# Patient Record
Sex: FEMALE | Race: WHITE
[De-identification: names, ages, dates, MRNs, and addresses within clinical notes are randomized per-mention and may not be internally consistent; named-entity substitution may affect disease eponyms.]

---

## 2017-04-12 ENCOUNTER — HOSPITAL ENCOUNTER (INPATIENT)
Dept: HOSPITAL 23 - P1E | Age: 52
LOS: 7 days | Discharge: HOME | DRG: 897 | End: 2017-04-19
Admitting: PSYCHIATRY & NEUROLOGY
Payer: COMMERCIAL

## 2017-04-12 DIAGNOSIS — Z59.0: ICD-10-CM

## 2017-04-12 DIAGNOSIS — F11.20: Primary | ICD-10-CM

## 2017-04-12 DIAGNOSIS — B19.20: ICD-10-CM

## 2017-04-12 DIAGNOSIS — K21.9: ICD-10-CM

## 2017-04-12 DIAGNOSIS — F17.210: ICD-10-CM

## 2017-04-12 DIAGNOSIS — F13.20: ICD-10-CM

## 2017-04-12 PROCEDURE — HZ2ZZZZ DETOXIFICATION SERVICES FOR SUBSTANCE ABUSE TREATMENT: ICD-10-PCS | Performed by: PSYCHIATRY & NEUROLOGY

## 2017-04-12 SDOH — ECONOMIC STABILITY - HOUSING INSECURITY: HOMELESSNESS: Z59.0

## 2017-04-17 LAB
BARBITURATES UR QL SCN: 0.7 MG/DL (ref 0.2–2)
BARBITURATES UR QL SCN: 4.4 G/DL (ref 3.5–5)
BARBITURATES: (no result)
BASOPHIL#: 0.1 X10E3 (ref 0–0.3)
BASOPHIL%: 0.9 % (ref 0–2.5)
BENZODIAZ UR QL SCN: 16 U/L (ref 10–40)
BENZODIAZ UR QL SCN: 30 U/L (ref 10–42)
BENZODIAZEPINES: (no result)
BLOOD UREA NITROGEN: 22 MG/DL (ref 9–23)
BUN/CREATININE RATIO: 24.44
BZE UR QL SCN: 67 U/L (ref 32–92)
CALCIUM SERUM: 9.8 MG/DL (ref 8.4–10.2)
CK MB SERPL-RTO: 14.1 % (ref 11–15.5)
CK MB SERPL-RTO: 33.4 G/DL (ref 30–36)
COCAINE: (no result)
CREATININE SERUM: 0.9 MG/DL (ref 0.6–1.4)
DIFF IND: NO
DX ICD CODE: (no result)
DX ICD CODE: (no result)
EOSINOPHIL#: 0.1 X10E3 (ref 0–0.7)
EOSINOPHIL%: 1.5 % (ref 0–7)
GLOM FILT RATE ESTIMATED: 75.1 ML/MIN (ref 60–?)
GLUCOSE FASTING: 132 MG/DL (ref 70–110)
HEMATOCRIT: 45.7 % (ref 35–45)
HEMOGLOBIN: 15.2 GM/DL (ref 12–16)
KETONES UR QL: 101 MMOL/L (ref 100–111)
KETONES UR QL: 24 MMOL/L (ref 22–31)
LYMPHOCYTE#: 2 X10E3 (ref 1–3.5)
LYMPHOCYTE%: 30 % (ref 17–45)
MEAN CELL VOLUME: 81.8 FL (ref 83–96)
MEAN CORPUSCULAR HEMOGLOBIN: 27.3 PG (ref 28–34)
MEAN PLATELET VOLUME: 8.2 FL (ref 6.5–11.5)
MONOCYTE#: 0.4 X10E3 (ref 0–1)
MONOCYTE%: 6.6 % (ref 3–12)
NEUTROPHIL#: 4 X10E3 (ref 1.5–7.1)
NEUTROPHIL%: 61 % (ref 40–75)
OPIATES: (no result)
PLATELET COUNT: 340 X10E3 (ref 140–420)
POTASSIUM: 4.2 MMOL/L (ref 3.5–5.1)
PROTEIN TOTAL SERUM: 8 G/DL (ref 6–8.3)
RED BLOOD COUNT: 5.58 X10E (ref 3.9–5.3)
SODIUM: 136 MMOL/L (ref 135–145)
TRICYCLIC ANTIDEPRESSANTS: (no result)
U METHADONE: (no result)
WHITE BLOOD COUNT: 6.5 X10E3 (ref 4–10.5)

## 2017-04-22 LAB
HBSAG CONF (REFLEX-HEPPANEL): (no result)
HEP C AB SIGNAL TO CUTOFF: 30.8 RATIO (ref ?–1)

## 2017-08-09 ENCOUNTER — HOSPITAL ENCOUNTER (INPATIENT)
Dept: HOSPITAL 23 - P1E | Age: 52
LOS: 4 days | Discharge: HOME | DRG: 897 | End: 2017-08-13
Attending: PSYCHIATRY & NEUROLOGY | Admitting: PSYCHIATRY & NEUROLOGY
Payer: COMMERCIAL

## 2017-08-09 VITALS — WEIGHT: 180 LBS | HEIGHT: 69 IN | BODY MASS INDEX: 26.66 KG/M2

## 2017-08-09 DIAGNOSIS — K21.9: ICD-10-CM

## 2017-08-09 DIAGNOSIS — J44.9: ICD-10-CM

## 2017-08-09 DIAGNOSIS — F39: ICD-10-CM

## 2017-08-09 DIAGNOSIS — F17.200: ICD-10-CM

## 2017-08-09 DIAGNOSIS — Z86.19: ICD-10-CM

## 2017-08-09 DIAGNOSIS — F11.23: Primary | ICD-10-CM

## 2017-08-09 DIAGNOSIS — Z98.51: ICD-10-CM

## 2017-08-09 DIAGNOSIS — I10: ICD-10-CM

## 2017-08-09 DIAGNOSIS — F13.10: ICD-10-CM

## 2017-08-09 PROCEDURE — HZ2ZZZZ DETOXIFICATION SERVICES FOR SUBSTANCE ABUSE TREATMENT: ICD-10-PCS | Performed by: PSYCHIATRY & NEUROLOGY

## 2017-08-10 LAB
BARBITURATES: (no result)
BENZODIAZEPINES: (no result)
COCAINE: (no result)
DX ICD CODE: (no result)
DX ICD CODE: (no result)
OPIATES: (no result)
TRICYCLIC ANTIDEPRESSANTS: (no result)
U METHADONE: (no result)

## 2019-01-01 ENCOUNTER — TELEPHONE (OUTPATIENT)
Dept: ENDOCRINOLOGY | Facility: CLINIC | Age: 54
End: 2019-01-01

## 2019-01-01 ENCOUNTER — TELEPHONE (OUTPATIENT)
Dept: PSYCHIATRY | Facility: CLINIC | Age: 54
End: 2019-01-01

## 2019-01-01 ENCOUNTER — APPOINTMENT (OUTPATIENT)
Dept: NUCLEAR MEDICINE | Facility: HOSPITAL | Age: 54
End: 2019-01-01

## 2019-01-01 ENCOUNTER — OFFICE VISIT (OUTPATIENT)
Dept: ENDOCRINOLOGY | Facility: CLINIC | Age: 54
End: 2019-01-01

## 2019-01-01 ENCOUNTER — APPOINTMENT (OUTPATIENT)
Dept: CARDIOLOGY | Facility: HOSPITAL | Age: 54
End: 2019-01-01

## 2019-01-01 ENCOUNTER — OFFICE VISIT (OUTPATIENT)
Dept: PSYCHIATRY | Facility: CLINIC | Age: 54
End: 2019-01-01

## 2019-01-01 ENCOUNTER — APPOINTMENT (OUTPATIENT)
Dept: RESPIRATORY THERAPY | Facility: HOSPITAL | Age: 54
End: 2019-01-01

## 2019-01-01 ENCOUNTER — APPOINTMENT (OUTPATIENT)
Dept: GENERAL RADIOLOGY | Facility: HOSPITAL | Age: 54
End: 2019-01-01

## 2019-01-01 ENCOUNTER — LAB (OUTPATIENT)
Dept: LAB | Facility: HOSPITAL | Age: 54
End: 2019-01-01

## 2019-01-01 ENCOUNTER — HOSPITAL ENCOUNTER (INPATIENT)
Facility: HOSPITAL | Age: 54
LOS: 8 days | Discharge: HOME | End: 2019-12-31
Attending: EMERGENCY MEDICINE | Admitting: INTERNAL MEDICINE

## 2019-01-01 ENCOUNTER — HOSPITAL ENCOUNTER (INPATIENT)
Facility: HOSPITAL | Age: 54
End: 2019-01-01
Attending: INTERNAL MEDICINE | Admitting: INTERNAL MEDICINE

## 2019-01-01 VITALS
OXYGEN SATURATION: 96 % | WEIGHT: 290.57 LBS | BODY MASS INDEX: 43.04 KG/M2 | HEART RATE: 85 BPM | HEIGHT: 69 IN | DIASTOLIC BLOOD PRESSURE: 72 MMHG | RESPIRATION RATE: 18 BRPM | TEMPERATURE: 98.3 F | SYSTOLIC BLOOD PRESSURE: 104 MMHG

## 2019-01-01 VITALS
OXYGEN SATURATION: 97 % | WEIGHT: 265 LBS | DIASTOLIC BLOOD PRESSURE: 82 MMHG | HEIGHT: 69 IN | SYSTOLIC BLOOD PRESSURE: 142 MMHG | HEART RATE: 116 BPM | BODY MASS INDEX: 39.25 KG/M2

## 2019-01-01 DIAGNOSIS — I20.0 UNSTABLE ANGINA PECTORIS (HCC): ICD-10-CM

## 2019-01-01 DIAGNOSIS — I10 HYPERTENSION, UNSPECIFIED TYPE: ICD-10-CM

## 2019-01-01 DIAGNOSIS — E78.2 HYPERLIPIDEMIA, MIXED: ICD-10-CM

## 2019-01-01 DIAGNOSIS — E11.65 TYPE 2 DIABETES MELLITUS WITH HYPERGLYCEMIA, UNSPECIFIED WHETHER LONG TERM INSULIN USE (HCC): Primary | ICD-10-CM

## 2019-01-01 DIAGNOSIS — E11.65 TYPE 2 DIABETES MELLITUS WITH HYPERGLYCEMIA, UNSPECIFIED WHETHER LONG TERM INSULIN USE (HCC): ICD-10-CM

## 2019-01-01 DIAGNOSIS — E55.9 VITAMIN D DEFICIENCY: ICD-10-CM

## 2019-01-01 DIAGNOSIS — IMO0001 INSULIN DEPENDENT DIABETES MELLITUS: ICD-10-CM

## 2019-01-01 DIAGNOSIS — F31.63 BIPOLAR 1 DISORDER, MIXED, SEVERE (HCC): Primary | ICD-10-CM

## 2019-01-01 DIAGNOSIS — I10 ESSENTIAL HYPERTENSION: ICD-10-CM

## 2019-01-01 DIAGNOSIS — J44.9 CHRONIC OBSTRUCTIVE PULMONARY DISEASE, UNSPECIFIED COPD TYPE (HCC): ICD-10-CM

## 2019-01-01 DIAGNOSIS — I20.0 UNSTABLE ANGINA (HCC): Primary | ICD-10-CM

## 2019-01-01 DIAGNOSIS — F43.12 CHRONIC POSTTRAUMATIC STRESS DISORDER: Chronic | ICD-10-CM

## 2019-01-01 DIAGNOSIS — F31.63 BIPOLAR 1 DISORDER, MIXED, SEVERE (HCC): ICD-10-CM

## 2019-01-01 DIAGNOSIS — F43.10 POST TRAUMATIC STRESS DISORDER (PTSD): ICD-10-CM

## 2019-01-01 DIAGNOSIS — R94.39 ABNORMAL NUCLEAR STRESS TEST: ICD-10-CM

## 2019-01-01 DIAGNOSIS — E11.65 TYPE 2 DIABETES MELLITUS WITH HYPERGLYCEMIA, WITHOUT LONG-TERM CURRENT USE OF INSULIN (HCC): ICD-10-CM

## 2019-01-01 DIAGNOSIS — R07.9 CHEST PAIN IN ADULT: ICD-10-CM

## 2019-01-01 DIAGNOSIS — E11.65 TYPE 2 DIABETES MELLITUS WITH HYPERGLYCEMIA, WITHOUT LONG-TERM CURRENT USE OF INSULIN (HCC): Primary | ICD-10-CM

## 2019-01-01 DIAGNOSIS — E78.2 HYPERLIPIDEMIA, MIXED: Primary | ICD-10-CM

## 2019-01-01 DIAGNOSIS — F31.64 BIPOLAR AFFECTIVE DISORDER, MIXED, SEVERE, WITH PSYCHOTIC BEHAVIOR (HCC): Primary | Chronic | ICD-10-CM

## 2019-01-01 DIAGNOSIS — E66.01 OBESITY, CLASS III, BMI 40-49.9 (MORBID OBESITY) (HCC): ICD-10-CM

## 2019-01-01 DIAGNOSIS — F31.64 BIPOLAR AFFECTIVE DISORDER, MIXED, SEVERE, WITH PSYCHOTIC BEHAVIOR (HCC): Chronic | ICD-10-CM

## 2019-01-01 LAB
ABO GROUP BLD: NORMAL
ALBUMIN SERPL-MCNC: 3.5 G/DL (ref 3.5–5.2)
ALBUMIN SERPL-MCNC: 4.4 G/DL (ref 3.5–5.2)
ALBUMIN SERPL-MCNC: 4.5 G/DL (ref 3.5–5.2)
ALBUMIN UR-MCNC: <1.2 MG/DL
ALBUMIN/GLOB SERPL: 1.1 G/DL
ALBUMIN/GLOB SERPL: 1.2 G/DL
ALBUMIN/GLOB SERPL: 1.3 G/DL
ALP SERPL-CCNC: 108 U/L (ref 39–117)
ALP SERPL-CCNC: 96 U/L (ref 39–117)
ALP SERPL-CCNC: 97 U/L (ref 39–117)
ALT SERPL W P-5'-P-CCNC: 30 U/L (ref 1–33)
ALT SERPL W P-5'-P-CCNC: 41 U/L (ref 1–33)
ALT SERPL W P-5'-P-CCNC: 62 U/L (ref 1–33)
ANION GAP SERPL CALCULATED.3IONS-SCNC: 10 MMOL/L (ref 5–15)
ANION GAP SERPL CALCULATED.3IONS-SCNC: 11 MMOL/L (ref 5–15)
ANION GAP SERPL CALCULATED.3IONS-SCNC: 11 MMOL/L (ref 5–15)
ANION GAP SERPL CALCULATED.3IONS-SCNC: 12 MMOL/L (ref 5–15)
ANION GAP SERPL CALCULATED.3IONS-SCNC: 15.9 MMOL/L (ref 5–15)
APTT PPP: 24.8 SECONDS (ref 24–31)
APTT PPP: 25.4 SECONDS (ref 24–31)
APTT PPP: 25.5 SECONDS (ref 24–31)
APTT PPP: 25.8 SECONDS (ref 24–31)
AST SERPL-CCNC: 27 U/L (ref 1–32)
AST SERPL-CCNC: 42 U/L (ref 1–32)
AST SERPL-CCNC: 59 U/L (ref 1–32)
BASOPHILS # BLD AUTO: 0 10*3/MM3 (ref 0–0.2)
BASOPHILS # BLD AUTO: 0 10*3/MM3 (ref 0–0.2)
BASOPHILS # BLD AUTO: 0.1 10*3/MM3 (ref 0–0.2)
BASOPHILS # BLD AUTO: 0.1 10*3/MM3 (ref 0–0.2)
BASOPHILS NFR BLD AUTO: 0.3 % (ref 0–1.5)
BASOPHILS NFR BLD AUTO: 0.4 % (ref 0–1.5)
BASOPHILS NFR BLD AUTO: 0.9 % (ref 0–1.5)
BASOPHILS NFR BLD AUTO: 1.1 % (ref 0–1.5)
BH CV ECHO MEAS - AO MAX PG (FULL): 14.4 MMHG
BH CV ECHO MEAS - AO MAX PG: 23 MMHG
BH CV ECHO MEAS - AO MEAN PG (FULL): 8.3 MMHG
BH CV ECHO MEAS - AO MEAN PG: 12.9 MMHG
BH CV ECHO MEAS - AO ROOT AREA (BSA CORRECTED): 1.3
BH CV ECHO MEAS - AO ROOT AREA: 7.1 CM^2
BH CV ECHO MEAS - AO ROOT DIAM: 3 CM
BH CV ECHO MEAS - AO V2 MAX: 239.1 CM/SEC
BH CV ECHO MEAS - AO V2 MEAN: 170.9 CM/SEC
BH CV ECHO MEAS - AO V2 VTI: 46.5 CM
BH CV ECHO MEAS - AORTIC HR: 75.3 BPM
BH CV ECHO MEAS - AORTIC R-R: 0.8 SEC
BH CV ECHO MEAS - AVA(I,A): 2.5 CM^2
BH CV ECHO MEAS - AVA(I,D): 2.5 CM^2
BH CV ECHO MEAS - AVA(V,A): 1.9 CM^2
BH CV ECHO MEAS - AVA(V,D): 1.9 CM^2
BH CV ECHO MEAS - BSA(HAYCOCK): 2.6 M^2
BH CV ECHO MEAS - BSA: 2.4 M^2
BH CV ECHO MEAS - BZI_BMI: 41.5 KILOGRAMS/M^2
BH CV ECHO MEAS - BZI_METRIC_HEIGHT: 175.3 CM
BH CV ECHO MEAS - BZI_METRIC_WEIGHT: 127.5 KG
BH CV ECHO MEAS - CI(AO): 10.4 L/MIN/M^2
BH CV ECHO MEAS - CI(LVOT): 3.7 L/MIN/M^2
BH CV ECHO MEAS - CO(AO): 24.8 L/MIN
BH CV ECHO MEAS - CO(LVOT): 8.9 L/MIN
BH CV ECHO MEAS - EDV(CUBED): 71.3 ML
BH CV ECHO MEAS - EDV(MOD-SP4): 96.1 ML
BH CV ECHO MEAS - EDV(TEICH): 76.3 ML
BH CV ECHO MEAS - EF(CUBED): 66.3 %
BH CV ECHO MEAS - EF(MOD-BP): 62 %
BH CV ECHO MEAS - EF(MOD-SP4): 62.3 %
BH CV ECHO MEAS - EF(TEICH): 58.2 %
BH CV ECHO MEAS - ESV(CUBED): 24.1 ML
BH CV ECHO MEAS - ESV(MOD-SP4): 36.2 ML
BH CV ECHO MEAS - ESV(TEICH): 31.9 ML
BH CV ECHO MEAS - FS: 30.4 %
BH CV ECHO MEAS - IVS/LVPW: 0.97
BH CV ECHO MEAS - IVSD: 0.93 CM
BH CV ECHO MEAS - LA DIMENSION(2D): 4.5 CM
BH CV ECHO MEAS - LV DIASTOLIC VOL/BSA (35-75): 40.2 ML/M^2
BH CV ECHO MEAS - LV MASS(C)D: 124.3 GRAMS
BH CV ECHO MEAS - LV MASS(C)DI: 52.1 GRAMS/M^2
BH CV ECHO MEAS - LV MAX PG: 8.6 MMHG
BH CV ECHO MEAS - LV MEAN PG: 4.5 MMHG
BH CV ECHO MEAS - LV SYSTOLIC VOL/BSA (12-30): 15.2 ML/M^2
BH CV ECHO MEAS - LV V1 MAX: 146.3 CM/SEC
BH CV ECHO MEAS - LV V1 MEAN: 98.8 CM/SEC
BH CV ECHO MEAS - LV V1 VTI: 37.6 CM
BH CV ECHO MEAS - LVIDD: 4.1 CM
BH CV ECHO MEAS - LVIDS: 2.9 CM
BH CV ECHO MEAS - LVOT AREA: 3.1 CM^2
BH CV ECHO MEAS - LVOT DIAM: 2 CM
BH CV ECHO MEAS - LVPWD: 0.96 CM
BH CV ECHO MEAS - MV A MAX VEL: 142.4 CM/SEC
BH CV ECHO MEAS - MV DEC SLOPE: 403.2 CM/SEC^2
BH CV ECHO MEAS - MV DEC TIME: 0.3 SEC
BH CV ECHO MEAS - MV E MAX VEL: 119.7 CM/SEC
BH CV ECHO MEAS - MV E/A: 0.84
BH CV ECHO MEAS - MV MAX PG: 7.2 MMHG
BH CV ECHO MEAS - MV MEAN PG: 3.2 MMHG
BH CV ECHO MEAS - MV V2 MAX: 134.1 CM/SEC
BH CV ECHO MEAS - MV V2 MEAN: 84 CM/SEC
BH CV ECHO MEAS - MV V2 VTI: 38.4 CM
BH CV ECHO MEAS - MVA(VTI): 3.1 CM^2
BH CV ECHO MEAS - PA MAX PG (FULL): 3.3 MMHG
BH CV ECHO MEAS - PA MAX PG: 5.8 MMHG
BH CV ECHO MEAS - PA V2 MAX: 120.4 CM/SEC
BH CV ECHO MEAS - PULM A REVS DUR: 0.12 SEC
BH CV ECHO MEAS - PULM A REVS VEL: 32.5 CM/SEC
BH CV ECHO MEAS - PULM DIAS VEL: 30.5 CM/SEC
BH CV ECHO MEAS - PULM S/D: 1.5
BH CV ECHO MEAS - PULM SYS VEL: 47.1 CM/SEC
BH CV ECHO MEAS - PVA(V,A): 1.9 CM^2
BH CV ECHO MEAS - PVA(V,D): 1.9 CM^2
BH CV ECHO MEAS - QP/QS: 0.44
BH CV ECHO MEAS - RV MAX PG: 2.5 MMHG
BH CV ECHO MEAS - RV MEAN PG: 1.4 MMHG
BH CV ECHO MEAS - RV V1 MAX: 78.8 CM/SEC
BH CV ECHO MEAS - RV V1 MEAN: 55.9 CM/SEC
BH CV ECHO MEAS - RV V1 VTI: 17.7 CM
BH CV ECHO MEAS - RVDD: 3 CM
BH CV ECHO MEAS - RVOT AREA: 2.9 CM^2
BH CV ECHO MEAS - RVOT DIAM: 1.9 CM
BH CV ECHO MEAS - SI(AO): 138.4 ML/M^2
BH CV ECHO MEAS - SI(CUBED): 19.8 ML/M^2
BH CV ECHO MEAS - SI(LVOT): 49.4 ML/M^2
BH CV ECHO MEAS - SI(MOD-SP4): 25.1 ML/M^2
BH CV ECHO MEAS - SI(TEICH): 18.6 ML/M^2
BH CV ECHO MEAS - SV(AO): 330.2 ML
BH CV ECHO MEAS - SV(CUBED): 47.3 ML
BH CV ECHO MEAS - SV(LVOT): 117.9 ML
BH CV ECHO MEAS - SV(MOD-SP4): 59.8 ML
BH CV ECHO MEAS - SV(RVOT): 52 ML
BH CV ECHO MEAS - SV(TEICH): 44.4 ML
BH CV NUCLEAR PRIOR STUDY: 3
BH CV STRESS BP STAGE 1: NORMAL
BH CV STRESS BP STAGE 2: NORMAL
BH CV STRESS BP STAGE 3: NORMAL
BH CV STRESS BP STAGE 4: NORMAL
BH CV STRESS COMMENTS STAGE 1: NORMAL
BH CV STRESS COMMENTS STAGE 2: NORMAL
BH CV STRESS DOSE REGADENOSON STAGE 1: 0.4
BH CV STRESS DURATION MIN STAGE 1: 1
BH CV STRESS DURATION MIN STAGE 2: 1
BH CV STRESS DURATION MIN STAGE 3: 1
BH CV STRESS DURATION MIN STAGE 4: 1
BH CV STRESS DURATION SEC STAGE 1: 10
BH CV STRESS DURATION SEC STAGE 2: 0
BH CV STRESS HR STAGE 1: 73
BH CV STRESS HR STAGE 2: 103
BH CV STRESS HR STAGE 3: 104
BH CV STRESS HR STAGE 4: 95
BH CV STRESS PROTOCOL 1: NORMAL
BH CV STRESS RECOVERY BP: NORMAL MMHG
BH CV STRESS RECOVERY HR: 92 BPM
BH CV STRESS STAGE 1: 1
BH CV STRESS STAGE 2: 2
BH CV STRESS STAGE 3: 3
BH CV STRESS STAGE 4: 4
BH CV XLRA MEAS - DIST GSV CALF DIST LEFT: 0.34 CM
BH CV XLRA MEAS - DIST GSV CALF DIST RIGHT: 0.32 CM
BH CV XLRA MEAS - DIST GSV THIGH DIST LEFT: 0.48 CM
BH CV XLRA MEAS - DIST GSV THIGH DIST RIGHT: 0.51 CM
BH CV XLRA MEAS - MID GSV CALF LEFT: 0.29 CM
BH CV XLRA MEAS - MID GSV CALF RIGHT: 0.32 CM
BH CV XLRA MEAS - MID GSV THIGH  LEFT: 0.52 CM
BH CV XLRA MEAS - MID GSV THIGH  RIGHT: 0.47 CM
BH CV XLRA MEAS - PROX GSV CALF DIST LEFT: 0.36 CM
BH CV XLRA MEAS - PROX GSV CALF DIST RIGHT: 0.38 CM
BH CV XLRA MEAS - PROX GSV THIGH  LEFT: 0.51 CM
BH CV XLRA MEAS - PROX GSV THIGH  RIGHT: 0.48 CM
BH CV XLRA MEAS LEFT CCA RATIO VEL: 111 CM/SEC
BH CV XLRA MEAS LEFT DIST CCA EDV: 24.6 CM/SEC
BH CV XLRA MEAS LEFT DIST CCA PSV: 74.2 CM/SEC
BH CV XLRA MEAS LEFT DIST ICA EDV: -26.3 CM/SEC
BH CV XLRA MEAS LEFT DIST ICA PSV: -71.7 CM/SEC
BH CV XLRA MEAS LEFT ICA RATIO VEL: -89.5 CM/SEC
BH CV XLRA MEAS LEFT ICA/CCA RATIO: -0.81
BH CV XLRA MEAS LEFT PROX CCA EDV: 21.7 CM/SEC
BH CV XLRA MEAS LEFT PROX CCA PSV: 111 CM/SEC
BH CV XLRA MEAS LEFT PROX ECA PSV: -69.4 CM/SEC
BH CV XLRA MEAS LEFT PROX ICA EDV: -31.1 CM/SEC
BH CV XLRA MEAS LEFT PROX ICA PSV: -82 CM/SEC
BH CV XLRA MEAS LEFT PROX SCLA PSV: 125 CM/SEC
BH CV XLRA MEAS LEFT VERTEBRAL A PSV: 54.9 CM/SEC
BH CV XLRA MEAS RIGHT CCA RATIO VEL: 83.4 CM/SEC
BH CV XLRA MEAS RIGHT DIST CCA EDV: 13.7 CM/SEC
BH CV XLRA MEAS RIGHT DIST CCA PSV: 53.7 CM/SEC
BH CV XLRA MEAS RIGHT DIST ICA EDV: -22.1 CM/SEC
BH CV XLRA MEAS RIGHT DIST ICA PSV: -65.4 CM/SEC
BH CV XLRA MEAS RIGHT ICA RATIO VEL: -75.2 CM/SEC
BH CV XLRA MEAS RIGHT ICA/CCA RATIO: -0.9
BH CV XLRA MEAS RIGHT PROX CCA EDV: 20.3 CM/SEC
BH CV XLRA MEAS RIGHT PROX CCA PSV: 83.4 CM/SEC
BH CV XLRA MEAS RIGHT PROX ECA PSV: -75.2 CM/SEC
BH CV XLRA MEAS RIGHT PROX ICA EDV: -27.5 CM/SEC
BH CV XLRA MEAS RIGHT PROX ICA PSV: -75.2 CM/SEC
BH CV XLRA MEAS RIGHT PROX SCLA PSV: 167 CM/SEC
BH CV XLRA MEAS RIGHT VERTEBRAL A PSV: -56 CM/SEC
BILIRUB SERPL-MCNC: 0.2 MG/DL (ref 0.2–1.2)
BILIRUB SERPL-MCNC: 0.2 MG/DL (ref 0.2–1.2)
BILIRUB SERPL-MCNC: 0.3 MG/DL (ref 0.2–1.2)
BILIRUB UR QL STRIP: NEGATIVE
BLD GP AB SCN SERPL QL: NEGATIVE
BUN BLD-MCNC: 13 MG/DL (ref 6–20)
BUN BLD-MCNC: 14 MG/DL (ref 6–20)
BUN BLD-MCNC: 15 MG/DL (ref 6–20)
BUN BLD-MCNC: 16 MG/DL (ref 6–20)
BUN BLD-MCNC: 17 MG/DL (ref 6–20)
BUN BLD-MCNC: 17 MG/DL (ref 6–20)
BUN BLD-MCNC: 19 MG/DL (ref 6–20)
BUN/CREAT SERPL: 15 (ref 7–25)
BUN/CREAT SERPL: 15.1 (ref 7–25)
BUN/CREAT SERPL: 16.9 (ref 7–25)
BUN/CREAT SERPL: 18.3 (ref 7–25)
BUN/CREAT SERPL: 18.5 (ref 7–25)
BUN/CREAT SERPL: 20 (ref 7–25)
BUN/CREAT SERPL: 20.7 (ref 7–25)
CALCIUM SPEC-SCNC: 8.6 MG/DL (ref 8.6–10.5)
CALCIUM SPEC-SCNC: 8.6 MG/DL (ref 8.6–10.5)
CALCIUM SPEC-SCNC: 8.7 MG/DL (ref 8.6–10.5)
CALCIUM SPEC-SCNC: 8.7 MG/DL (ref 8.6–10.5)
CALCIUM SPEC-SCNC: 8.8 MG/DL (ref 8.6–10.5)
CALCIUM SPEC-SCNC: 9.1 MG/DL (ref 8.6–10.5)
CALCIUM SPEC-SCNC: 9.3 MG/DL (ref 8.6–10.5)
CHLORIDE SERPL-SCNC: 100 MMOL/L (ref 98–107)
CHLORIDE SERPL-SCNC: 100 MMOL/L (ref 98–107)
CHLORIDE SERPL-SCNC: 103 MMOL/L (ref 98–107)
CHLORIDE SERPL-SCNC: 92 MMOL/L (ref 98–107)
CHLORIDE SERPL-SCNC: 97 MMOL/L (ref 98–107)
CHLORIDE SERPL-SCNC: 99 MMOL/L (ref 98–107)
CHLORIDE SERPL-SCNC: 99 MMOL/L (ref 98–107)
CHOLEST SERPL-MCNC: 170 MG/DL (ref 0–200)
CHOLEST SERPL-MCNC: 224 MG/DL (ref 0–200)
CLARITY UR: CLEAR
CLOSE TME COLL+ADP + EPINEP PNL BLD: 95 % (ref 86–100)
CO2 SERPL-SCNC: 24.1 MMOL/L (ref 22–29)
CO2 SERPL-SCNC: 26 MMOL/L (ref 22–29)
CO2 SERPL-SCNC: 26 MMOL/L (ref 22–29)
CO2 SERPL-SCNC: 27 MMOL/L (ref 22–29)
CO2 SERPL-SCNC: 27 MMOL/L (ref 22–29)
CO2 SERPL-SCNC: 28 MMOL/L (ref 22–29)
CO2 SERPL-SCNC: 28 MMOL/L (ref 22–29)
COLOR UR: YELLOW
CREAT BLD-MCNC: 0.82 MG/DL (ref 0.57–1)
CREAT BLD-MCNC: 0.82 MG/DL (ref 0.57–1)
CREAT BLD-MCNC: 0.83 MG/DL (ref 0.57–1)
CREAT BLD-MCNC: 0.86 MG/DL (ref 0.57–1)
CREAT BLD-MCNC: 0.92 MG/DL (ref 0.57–1)
CREAT BLD-MCNC: 0.95 MG/DL (ref 0.57–1)
CREAT BLD-MCNC: 1.07 MG/DL (ref 0.57–1)
CREAT UR-MCNC: 39.7 MG/DL
D DIMER PPP FEU-MCNC: 0.29 MCGFEU/ML (ref 0.17–0.59)
DEPRECATED RDW RBC AUTO: 45.9 FL (ref 37–54)
DEPRECATED RDW RBC AUTO: 46.4 FL (ref 37–54)
DEPRECATED RDW RBC AUTO: 46.8 FL (ref 37–54)
DEPRECATED RDW RBC AUTO: 47.7 FL (ref 37–54)
DEPRECATED RDW RBC AUTO: 48.1 FL (ref 37–54)
EOSINOPHIL # BLD AUTO: 0.2 10*3/MM3 (ref 0–0.4)
EOSINOPHIL # BLD AUTO: 0.3 10*3/MM3 (ref 0–0.4)
EOSINOPHIL NFR BLD AUTO: 2.7 % (ref 0.3–6.2)
EOSINOPHIL NFR BLD AUTO: 4.3 % (ref 0.3–6.2)
EOSINOPHIL NFR BLD AUTO: 4.5 % (ref 0.3–6.2)
EOSINOPHIL NFR BLD AUTO: 4.8 % (ref 0.3–6.2)
ERYTHROCYTE [DISTWIDTH] IN BLOOD BY AUTOMATED COUNT: 15.5 % (ref 12.3–15.4)
ERYTHROCYTE [DISTWIDTH] IN BLOOD BY AUTOMATED COUNT: 15.7 % (ref 12.3–15.4)
ERYTHROCYTE [DISTWIDTH] IN BLOOD BY AUTOMATED COUNT: 15.8 % (ref 12.3–15.4)
GFR SERPL CREATININE-BSD FRML MDRD: 54 ML/MIN/1.73
GFR SERPL CREATININE-BSD FRML MDRD: 62 ML/MIN/1.73
GFR SERPL CREATININE-BSD FRML MDRD: 64 ML/MIN/1.73
GFR SERPL CREATININE-BSD FRML MDRD: 69 ML/MIN/1.73
GFR SERPL CREATININE-BSD FRML MDRD: 72 ML/MIN/1.73
GFR SERPL CREATININE-BSD FRML MDRD: 73 ML/MIN/1.73
GFR SERPL CREATININE-BSD FRML MDRD: 73 ML/MIN/1.73
GLOBULIN UR ELPH-MCNC: 3.1 GM/DL
GLOBULIN UR ELPH-MCNC: 3.6 GM/DL
GLOBULIN UR ELPH-MCNC: 3.7 GM/DL
GLUCOSE BLD-MCNC: 104 MG/DL (ref 65–99)
GLUCOSE BLD-MCNC: 190 MG/DL (ref 65–99)
GLUCOSE BLD-MCNC: 200 MG/DL (ref 65–99)
GLUCOSE BLD-MCNC: 248 MG/DL (ref 65–99)
GLUCOSE BLD-MCNC: 252 MG/DL (ref 65–99)
GLUCOSE BLD-MCNC: 315 MG/DL (ref 65–99)
GLUCOSE BLD-MCNC: 63 MG/DL (ref 65–99)
GLUCOSE BLDC GLUCOMTR-MCNC: 119 MG/DL (ref 70–105)
GLUCOSE BLDC GLUCOMTR-MCNC: 123 MG/DL (ref 70–105)
GLUCOSE BLDC GLUCOMTR-MCNC: 130 MG/DL (ref 70–105)
GLUCOSE BLDC GLUCOMTR-MCNC: 131 MG/DL (ref 70–105)
GLUCOSE BLDC GLUCOMTR-MCNC: 149 MG/DL (ref 70–105)
GLUCOSE BLDC GLUCOMTR-MCNC: 161 MG/DL (ref 70–105)
GLUCOSE BLDC GLUCOMTR-MCNC: 180 MG/DL (ref 70–105)
GLUCOSE BLDC GLUCOMTR-MCNC: 182 MG/DL (ref 70–105)
GLUCOSE BLDC GLUCOMTR-MCNC: 182 MG/DL (ref 70–105)
GLUCOSE BLDC GLUCOMTR-MCNC: 184 MG/DL (ref 70–105)
GLUCOSE BLDC GLUCOMTR-MCNC: 188 MG/DL (ref 70–105)
GLUCOSE BLDC GLUCOMTR-MCNC: 191 MG/DL (ref 70–105)
GLUCOSE BLDC GLUCOMTR-MCNC: 191 MG/DL (ref 70–105)
GLUCOSE BLDC GLUCOMTR-MCNC: 194 MG/DL (ref 70–105)
GLUCOSE BLDC GLUCOMTR-MCNC: 211 MG/DL (ref 70–105)
GLUCOSE BLDC GLUCOMTR-MCNC: 216 MG/DL (ref 70–105)
GLUCOSE BLDC GLUCOMTR-MCNC: 223 MG/DL (ref 70–105)
GLUCOSE BLDC GLUCOMTR-MCNC: 225 MG/DL (ref 70–105)
GLUCOSE BLDC GLUCOMTR-MCNC: 236 MG/DL (ref 70–105)
GLUCOSE BLDC GLUCOMTR-MCNC: 247 MG/DL (ref 70–105)
GLUCOSE BLDC GLUCOMTR-MCNC: 250 MG/DL (ref 70–105)
GLUCOSE BLDC GLUCOMTR-MCNC: 256 MG/DL (ref 70–105)
GLUCOSE BLDC GLUCOMTR-MCNC: 263 MG/DL (ref 70–105)
GLUCOSE BLDC GLUCOMTR-MCNC: 269 MG/DL (ref 70–105)
GLUCOSE BLDC GLUCOMTR-MCNC: 276 MG/DL (ref 70–105)
GLUCOSE BLDC GLUCOMTR-MCNC: 276 MG/DL (ref 70–105)
GLUCOSE BLDC GLUCOMTR-MCNC: 279 MG/DL (ref 70–105)
GLUCOSE BLDC GLUCOMTR-MCNC: 281 MG/DL (ref 70–130)
GLUCOSE BLDC GLUCOMTR-MCNC: 313 MG/DL (ref 70–105)
GLUCOSE BLDC GLUCOMTR-MCNC: 319 MG/DL (ref 70–105)
GLUCOSE BLDC GLUCOMTR-MCNC: 396 MG/DL (ref 70–105)
GLUCOSE BLDC GLUCOMTR-MCNC: 72 MG/DL (ref 70–105)
GLUCOSE BLDC GLUCOMTR-MCNC: 99 MG/DL (ref 70–105)
GLUCOSE UR STRIP-MCNC: ABNORMAL MG/DL
HBA1C MFR BLD: 8.5 % (ref 3.5–5.6)
HBA1C MFR BLD: 9.2 % (ref 3.5–5.6)
HCT VFR BLD AUTO: 33.6 % (ref 34–46.6)
HCT VFR BLD AUTO: 33.7 % (ref 34–46.6)
HCT VFR BLD AUTO: 34.2 % (ref 34–46.6)
HCT VFR BLD AUTO: 35.7 % (ref 34–46.6)
HCT VFR BLD AUTO: 36.8 % (ref 34–46.6)
HCV AB SER DONR QL: REACTIVE
HDLC SERPL-MCNC: 34 MG/DL (ref 40–60)
HDLC SERPL-MCNC: 40 MG/DL (ref 40–60)
HGB BLD-MCNC: 11.1 G/DL (ref 12–15.9)
HGB BLD-MCNC: 11.2 G/DL (ref 12–15.9)
HGB BLD-MCNC: 11.3 G/DL (ref 12–15.9)
HGB BLD-MCNC: 12 G/DL (ref 12–15.9)
HGB BLD-MCNC: 12.2 G/DL (ref 12–15.9)
HGB UR QL STRIP.AUTO: NEGATIVE
HOLD SPECIMEN: NORMAL
INR PPP: 0.92 (ref 0.9–1.1)
INR PPP: 0.94 (ref 0.9–1.1)
INR PPP: 0.95 (ref 0.9–1.1)
INR PPP: 0.98 (ref 0.9–1.1)
KETONES UR QL STRIP: NEGATIVE
LDLC SERPL CALC-MCNC: 150 MG/DL (ref 0–100)
LDLC SERPL CALC-MCNC: 97 MG/DL (ref 0–100)
LDLC/HDLC SERPL: 2.85 {RATIO}
LDLC/HDLC SERPL: 3.75 {RATIO}
LEUKOCYTE ESTERASE UR QL STRIP.AUTO: NEGATIVE
LIPASE SERPL-CCNC: 17 U/L (ref 13–60)
LV EF NUC BP: 69 %
LYMPHOCYTES # BLD AUTO: 1.5 10*3/MM3 (ref 0.7–3.1)
LYMPHOCYTES # BLD AUTO: 1.6 10*3/MM3 (ref 0.7–3.1)
LYMPHOCYTES # BLD AUTO: 2 10*3/MM3 (ref 0.7–3.1)
LYMPHOCYTES # BLD AUTO: 2.2 10*3/MM3 (ref 0.7–3.1)
LYMPHOCYTES NFR BLD AUTO: 24.7 % (ref 19.6–45.3)
LYMPHOCYTES NFR BLD AUTO: 26.8 % (ref 19.6–45.3)
LYMPHOCYTES NFR BLD AUTO: 28.1 % (ref 19.6–45.3)
LYMPHOCYTES NFR BLD AUTO: 33.9 % (ref 19.6–45.3)
MAGNESIUM SERPL-MCNC: 2.2 MG/DL (ref 1.6–2.6)
MAXIMAL PREDICTED HEART RATE: 168 BPM
MCH RBC QN AUTO: 27.4 PG (ref 26.6–33)
MCH RBC QN AUTO: 27.9 PG (ref 26.6–33)
MCH RBC QN AUTO: 27.9 PG (ref 26.6–33)
MCH RBC QN AUTO: 28.1 PG (ref 26.6–33)
MCH RBC QN AUTO: 28.5 PG (ref 26.6–33)
MCHC RBC AUTO-ENTMCNC: 32.5 G/DL (ref 31.5–35.7)
MCHC RBC AUTO-ENTMCNC: 32.9 G/DL (ref 31.5–35.7)
MCHC RBC AUTO-ENTMCNC: 33 G/DL (ref 31.5–35.7)
MCHC RBC AUTO-ENTMCNC: 33.3 G/DL (ref 31.5–35.7)
MCHC RBC AUTO-ENTMCNC: 34.2 G/DL (ref 31.5–35.7)
MCV RBC AUTO: 83.2 FL (ref 79–97)
MCV RBC AUTO: 83.4 FL (ref 79–97)
MCV RBC AUTO: 84.2 FL (ref 79–97)
MCV RBC AUTO: 84.3 FL (ref 79–97)
MCV RBC AUTO: 85.9 FL (ref 79–97)
MICROALBUMIN/CREAT UR: NORMAL MG/G{CREAT}
MONOCYTES # BLD AUTO: 0.3 10*3/MM3 (ref 0.1–0.9)
MONOCYTES # BLD AUTO: 0.4 10*3/MM3 (ref 0.1–0.9)
MONOCYTES # BLD AUTO: 0.5 10*3/MM3 (ref 0.1–0.9)
MONOCYTES # BLD AUTO: 0.6 10*3/MM3 (ref 0.1–0.9)
MONOCYTES NFR BLD AUTO: 5.9 % (ref 5–12)
MONOCYTES NFR BLD AUTO: 7.1 % (ref 5–12)
MONOCYTES NFR BLD AUTO: 7.5 % (ref 5–12)
MONOCYTES NFR BLD AUTO: 8 % (ref 5–12)
NEUTROPHILS # BLD AUTO: 3.1 10*3/MM3 (ref 1.7–7)
NEUTROPHILS # BLD AUTO: 3.5 10*3/MM3 (ref 1.7–7)
NEUTROPHILS # BLD AUTO: 3.9 10*3/MM3 (ref 1.7–7)
NEUTROPHILS # BLD AUTO: 5.1 10*3/MM3 (ref 1.7–7)
NEUTROPHILS NFR BLD AUTO: 52.2 % (ref 42.7–76)
NEUTROPHILS NFR BLD AUTO: 60.6 % (ref 42.7–76)
NEUTROPHILS NFR BLD AUTO: 62.7 % (ref 42.7–76)
NEUTROPHILS NFR BLD AUTO: 63.5 % (ref 42.7–76)
NITRITE UR QL STRIP: NEGATIVE
NRBC BLD AUTO-RTO: 0.1 /100 WBC (ref 0–0.2)
NRBC BLD AUTO-RTO: 0.2 /100 WBC (ref 0–0.2)
PERCENT MAX PREDICTED HR: 61.9 %
PH UR STRIP.AUTO: 5.5 [PH] (ref 5–8)
PLATELET # BLD AUTO: 227 10*3/MM3 (ref 140–450)
PLATELET # BLD AUTO: 233 10*3/MM3 (ref 140–450)
PLATELET # BLD AUTO: 237 10*3/MM3 (ref 140–450)
PLATELET # BLD AUTO: 240 10*3/MM3 (ref 140–450)
PLATELET # BLD AUTO: 363 10*3/MM3 (ref 140–450)
PMV BLD AUTO: 7.6 FL (ref 6–12)
PMV BLD AUTO: 8.4 FL (ref 6–12)
PMV BLD AUTO: 8.5 FL (ref 6–12)
PMV BLD AUTO: 8.9 FL (ref 6–12)
PMV BLD AUTO: 9.2 FL (ref 6–12)
POTASSIUM BLD-SCNC: 3.8 MMOL/L (ref 3.5–5.2)
POTASSIUM BLD-SCNC: 3.9 MMOL/L (ref 3.5–5.2)
POTASSIUM BLD-SCNC: 4 MMOL/L (ref 3.5–5.2)
POTASSIUM BLD-SCNC: 4 MMOL/L (ref 3.5–5.2)
POTASSIUM BLD-SCNC: 4.1 MMOL/L (ref 3.5–5.2)
POTASSIUM BLD-SCNC: 4.1 MMOL/L (ref 3.5–5.2)
POTASSIUM BLD-SCNC: 4.3 MMOL/L (ref 3.5–5.2)
POTASSIUM BLD-SCNC: 4.4 MMOL/L (ref 3.5–5.2)
POTASSIUM BLD-SCNC: 4.5 MMOL/L (ref 3.5–5.2)
PROT SERPL-MCNC: 6.6 G/DL (ref 6–8.5)
PROT SERPL-MCNC: 8.1 G/DL (ref 6–8.5)
PROT SERPL-MCNC: 8.1 G/DL (ref 6–8.5)
PROT UR QL STRIP: NEGATIVE
PROTHROMBIN TIME: 10 SECONDS (ref 9.6–11.7)
PROTHROMBIN TIME: 10 SECONDS (ref 9.6–11.7)
PROTHROMBIN TIME: 10.3 SECONDS (ref 9.6–11.7)
PROTHROMBIN TIME: 9.8 SECONDS (ref 9.6–11.7)
RBC # BLD AUTO: 4 10*6/MM3 (ref 3.77–5.28)
RBC # BLD AUTO: 4.04 10*6/MM3 (ref 3.77–5.28)
RBC # BLD AUTO: 4.05 10*6/MM3 (ref 3.77–5.28)
RBC # BLD AUTO: 4.28 10*6/MM3 (ref 3.77–5.28)
RBC # BLD AUTO: 4.28 10*6/MM3 (ref 3.77–5.28)
RH BLD: POSITIVE
SODIUM BLD-SCNC: 132 MMOL/L (ref 136–145)
SODIUM BLD-SCNC: 135 MMOL/L (ref 136–145)
SODIUM BLD-SCNC: 137 MMOL/L (ref 136–145)
SODIUM BLD-SCNC: 137 MMOL/L (ref 136–145)
SODIUM BLD-SCNC: 138 MMOL/L (ref 136–145)
SODIUM BLD-SCNC: 138 MMOL/L (ref 136–145)
SODIUM BLD-SCNC: 139 MMOL/L (ref 136–145)
SP GR UR STRIP: 1.02 (ref 1–1.03)
STRESS BASELINE BP: NORMAL MMHG
STRESS BASELINE HR: 79 BPM
STRESS PERCENT HR: 73 %
STRESS POST PEAK BP: NORMAL MMHG
STRESS POST PEAK HR: 104 BPM
STRESS TARGET HR: 143 BPM
T&S EXPIRATION DATE: NORMAL
TRIGL SERPL-MCNC: 170 MG/DL (ref 0–150)
TRIGL SERPL-MCNC: 196 MG/DL (ref 0–150)
TROPONIN T SERPL-MCNC: <0.01 NG/ML (ref 0–0.03)
TROPONIN T SERPL-MCNC: <0.01 NG/ML (ref 0–0.03)
TSH SERPL DL<=0.05 MIU/L-ACNC: 2.18 UIU/ML (ref 0.27–4.2)
UROBILINOGEN UR QL STRIP: ABNORMAL
VLDLC SERPL-MCNC: 34 MG/DL (ref 5–40)
VLDLC SERPL-MCNC: 39.2 MG/DL
WBC NRBC COR # BLD: 5.8 10*3/MM3 (ref 3.4–10.8)
WBC NRBC COR # BLD: 5.9 10*3/MM3 (ref 3.4–10.8)
WBC NRBC COR # BLD: 6.1 10*3/MM3 (ref 3.4–10.8)
WBC NRBC COR # BLD: 6.2 10*3/MM3 (ref 3.4–10.8)
WBC NRBC COR # BLD: 8.1 10*3/MM3 (ref 3.4–10.8)
WHOLE BLOOD HOLD SPECIMEN: NORMAL

## 2019-01-01 PROCEDURE — 85025 COMPLETE CBC W/AUTO DIFF WBC: CPT | Performed by: NURSE PRACTITIONER

## 2019-01-01 PROCEDURE — G0109 DIAB MANAGE TRN IND/GROUP: HCPCS | Performed by: INTERNAL MEDICINE

## 2019-01-01 PROCEDURE — 83036 HEMOGLOBIN GLYCOSYLATED A1C: CPT | Performed by: NURSE PRACTITIONER

## 2019-01-01 PROCEDURE — 99232 SBSQ HOSP IP/OBS MODERATE 35: CPT | Performed by: INTERNAL MEDICINE

## 2019-01-01 PROCEDURE — 86900 BLOOD TYPING SEROLOGIC ABO: CPT | Performed by: NURSE PRACTITIONER

## 2019-01-01 PROCEDURE — C1894 INTRO/SHEATH, NON-LASER: HCPCS | Performed by: INTERNAL MEDICINE

## 2019-01-01 PROCEDURE — C1760 CLOSURE DEV, VASC: HCPCS | Performed by: INTERNAL MEDICINE

## 2019-01-01 PROCEDURE — 94660 CPAP INITIATION&MGMT: CPT

## 2019-01-01 PROCEDURE — 82962 GLUCOSE BLOOD TEST: CPT

## 2019-01-01 PROCEDURE — 94729 DIFFUSING CAPACITY: CPT

## 2019-01-01 PROCEDURE — A9500 TC99M SESTAMIBI: HCPCS | Performed by: INTERNAL MEDICINE

## 2019-01-01 PROCEDURE — 63710000001 INSULIN LISPRO (HUMAN) PER 5 UNITS: Performed by: INTERNAL MEDICINE

## 2019-01-01 PROCEDURE — 63710000001 INSULIN LISPRO (HUMAN) PER 5 UNITS: Performed by: NURSE PRACTITIONER

## 2019-01-01 PROCEDURE — 85610 PROTHROMBIN TIME: CPT | Performed by: NURSE PRACTITIONER

## 2019-01-01 PROCEDURE — C1885 CATH, TRANSLUMIN ANGIO LASER: HCPCS | Performed by: INTERNAL MEDICINE

## 2019-01-01 PROCEDURE — 93018 CV STRESS TEST I&R ONLY: CPT | Performed by: INTERNAL MEDICINE

## 2019-01-01 PROCEDURE — 36415 COLL VENOUS BLD VENIPUNCTURE: CPT

## 2019-01-01 PROCEDURE — 85610 PROTHROMBIN TIME: CPT | Performed by: EMERGENCY MEDICINE

## 2019-01-01 PROCEDURE — 93016 CV STRESS TEST SUPVJ ONLY: CPT | Performed by: INTERNAL MEDICINE

## 2019-01-01 PROCEDURE — 93005 ELECTROCARDIOGRAM TRACING: CPT

## 2019-01-01 PROCEDURE — 92933 PRQ TRLML C ATHRC ST ANGIOP1: CPT | Performed by: INTERNAL MEDICINE

## 2019-01-01 PROCEDURE — 85576 BLOOD PLATELET AGGREGATION: CPT | Performed by: NURSE PRACTITIONER

## 2019-01-01 PROCEDURE — 63710000001 INSULIN LISPRO PROTAMINE-INSULIN LISPRO (75-25) 100 UNIT/ML SUSPENSION: Performed by: INTERNAL MEDICINE

## 2019-01-01 PROCEDURE — 99231 SBSQ HOSP IP/OBS SF/LOW 25: CPT | Performed by: INTERNAL MEDICINE

## 2019-01-01 PROCEDURE — B2111ZZ FLUOROSCOPY OF MULTIPLE CORONARY ARTERIES USING LOW OSMOLAR CONTRAST: ICD-10-PCS | Performed by: INTERNAL MEDICINE

## 2019-01-01 PROCEDURE — 93005 ELECTROCARDIOGRAM TRACING: CPT | Performed by: INTERNAL MEDICINE

## 2019-01-01 PROCEDURE — 93458 L HRT ARTERY/VENTRICLE ANGIO: CPT | Performed by: INTERNAL MEDICINE

## 2019-01-01 PROCEDURE — 97161 PT EVAL LOW COMPLEX 20 MIN: CPT

## 2019-01-01 PROCEDURE — 93005 ELECTROCARDIOGRAM TRACING: CPT | Performed by: EMERGENCY MEDICINE

## 2019-01-01 PROCEDURE — 85730 THROMBOPLASTIN TIME PARTIAL: CPT | Performed by: INTERNAL MEDICINE

## 2019-01-01 PROCEDURE — C9602 PERC D-E COR STENT ATHER S: HCPCS | Performed by: INTERNAL MEDICINE

## 2019-01-01 PROCEDURE — 99152 MOD SED SAME PHYS/QHP 5/>YRS: CPT | Performed by: INTERNAL MEDICINE

## 2019-01-01 PROCEDURE — 86901 BLOOD TYPING SEROLOGIC RH(D): CPT | Performed by: NURSE PRACTITIONER

## 2019-01-01 PROCEDURE — 84132 ASSAY OF SERUM POTASSIUM: CPT | Performed by: INTERNAL MEDICINE

## 2019-01-01 PROCEDURE — B2151ZZ FLUOROSCOPY OF LEFT HEART USING LOW OSMOLAR CONTRAST: ICD-10-PCS | Performed by: INTERNAL MEDICINE

## 2019-01-01 PROCEDURE — 94799 UNLISTED PULMONARY SVC/PX: CPT

## 2019-01-01 PROCEDURE — 82570 ASSAY OF URINE CREATININE: CPT

## 2019-01-01 PROCEDURE — 94727 GAS DIL/WSHOT DETER LNG VOL: CPT

## 2019-01-01 PROCEDURE — G0108 DIAB MANAGE TRN  PER INDIV: HCPCS | Performed by: DIETITIAN, REGISTERED

## 2019-01-01 PROCEDURE — 94010 BREATHING CAPACITY TEST: CPT

## 2019-01-01 PROCEDURE — 25010000002 FENTANYL CITRATE (PF) 100 MCG/2ML SOLUTION: Performed by: INTERNAL MEDICINE

## 2019-01-01 PROCEDURE — 99239 HOSP IP/OBS DSCHRG MGMT >30: CPT | Performed by: INTERNAL MEDICINE

## 2019-01-01 PROCEDURE — C1725 CATH, TRANSLUMIN NON-LASER: HCPCS | Performed by: INTERNAL MEDICINE

## 2019-01-01 PROCEDURE — 99214 OFFICE O/P EST MOD 30 MIN: CPT | Performed by: INTERNAL MEDICINE

## 2019-01-01 PROCEDURE — 25010000002 LORAZEPAM PER 2 MG: Performed by: EMERGENCY MEDICINE

## 2019-01-01 PROCEDURE — 85347 COAGULATION TIME ACTIVATED: CPT

## 2019-01-01 PROCEDURE — G0378 HOSPITAL OBSERVATION PER HR: HCPCS

## 2019-01-01 PROCEDURE — 25010000002 MIDAZOLAM PER 1 MG: Performed by: INTERNAL MEDICINE

## 2019-01-01 PROCEDURE — 83735 ASSAY OF MAGNESIUM: CPT | Performed by: NURSE PRACTITIONER

## 2019-01-01 PROCEDURE — 80061 LIPID PANEL: CPT | Performed by: NURSE PRACTITIONER

## 2019-01-01 PROCEDURE — 4A023N7 MEASUREMENT OF CARDIAC SAMPLING AND PRESSURE, LEFT HEART, PERCUTANEOUS APPROACH: ICD-10-PCS | Performed by: INTERNAL MEDICINE

## 2019-01-01 PROCEDURE — C1769 GUIDE WIRE: HCPCS | Performed by: INTERNAL MEDICINE

## 2019-01-01 PROCEDURE — C1887 CATHETER, GUIDING: HCPCS | Performed by: INTERNAL MEDICINE

## 2019-01-01 PROCEDURE — 80048 BASIC METABOLIC PNL TOTAL CA: CPT | Performed by: NURSE PRACTITIONER

## 2019-01-01 PROCEDURE — 25010000002 HEPARIN (PORCINE) PER 1000 UNITS: Performed by: INTERNAL MEDICINE

## 2019-01-01 PROCEDURE — 84443 ASSAY THYROID STIM HORMONE: CPT

## 2019-01-01 PROCEDURE — G0108 DIAB MANAGE TRN  PER INDIV: HCPCS | Performed by: INTERNAL MEDICINE

## 2019-01-01 PROCEDURE — 99284 EMERGENCY DEPT VISIT MOD MDM: CPT

## 2019-01-01 PROCEDURE — 027034Z DILATION OF CORONARY ARTERY, ONE ARTERY WITH DRUG-ELUTING INTRALUMINAL DEVICE, PERCUTANEOUS APPROACH: ICD-10-PCS | Performed by: INTERNAL MEDICINE

## 2019-01-01 PROCEDURE — 78452 HT MUSCLE IMAGE SPECT MULT: CPT | Performed by: INTERNAL MEDICINE

## 2019-01-01 PROCEDURE — 85025 COMPLETE CBC W/AUTO DIFF WBC: CPT | Performed by: EMERGENCY MEDICINE

## 2019-01-01 PROCEDURE — 99214 OFFICE O/P EST MOD 30 MIN: CPT | Performed by: PSYCHIATRY & NEUROLOGY

## 2019-01-01 PROCEDURE — 84484 ASSAY OF TROPONIN QUANT: CPT | Performed by: NURSE PRACTITIONER

## 2019-01-01 PROCEDURE — 80053 COMPREHEN METABOLIC PANEL: CPT

## 2019-01-01 PROCEDURE — 93970 EXTREMITY STUDY: CPT

## 2019-01-01 PROCEDURE — 82043 UR ALBUMIN QUANTITATIVE: CPT

## 2019-01-01 PROCEDURE — 99223 1ST HOSP IP/OBS HIGH 75: CPT | Performed by: INTERNAL MEDICINE

## 2019-01-01 PROCEDURE — 84484 ASSAY OF TROPONIN QUANT: CPT | Performed by: EMERGENCY MEDICINE

## 2019-01-01 PROCEDURE — 86901 BLOOD TYPING SEROLOGIC RH(D): CPT

## 2019-01-01 PROCEDURE — 80061 LIPID PANEL: CPT

## 2019-01-01 PROCEDURE — 80053 COMPREHEN METABOLIC PANEL: CPT | Performed by: EMERGENCY MEDICINE

## 2019-01-01 PROCEDURE — 78452 HT MUSCLE IMAGE SPECT MULT: CPT

## 2019-01-01 PROCEDURE — 82962 GLUCOSE BLOOD TEST: CPT | Performed by: INTERNAL MEDICINE

## 2019-01-01 PROCEDURE — 85027 COMPLETE CBC AUTOMATED: CPT | Performed by: INTERNAL MEDICINE

## 2019-01-01 PROCEDURE — 99153 MOD SED SAME PHYS/QHP EA: CPT | Performed by: INTERNAL MEDICINE

## 2019-01-01 PROCEDURE — 80053 COMPREHEN METABOLIC PANEL: CPT | Performed by: NURSE PRACTITIONER

## 2019-01-01 PROCEDURE — 86803 HEPATITIS C AB TEST: CPT | Performed by: NURSE PRACTITIONER

## 2019-01-01 PROCEDURE — 80048 BASIC METABOLIC PNL TOTAL CA: CPT | Performed by: INTERNAL MEDICINE

## 2019-01-01 PROCEDURE — 85610 PROTHROMBIN TIME: CPT | Performed by: INTERNAL MEDICINE

## 2019-01-01 PROCEDURE — 99231 SBSQ HOSP IP/OBS SF/LOW 25: CPT | Performed by: NURSE PRACTITIONER

## 2019-01-01 PROCEDURE — 25010000002 REGADENOSON 0.4 MG/5ML SOLUTION: Performed by: INTERNAL MEDICINE

## 2019-01-01 PROCEDURE — 83690 ASSAY OF LIPASE: CPT | Performed by: EMERGENCY MEDICINE

## 2019-01-01 PROCEDURE — 0 TECHNETIUM SESTAMIBI: Performed by: INTERNAL MEDICINE

## 2019-01-01 PROCEDURE — C1874 STENT, COATED/COV W/DEL SYS: HCPCS | Performed by: INTERNAL MEDICINE

## 2019-01-01 PROCEDURE — 86850 RBC ANTIBODY SCREEN: CPT | Performed by: NURSE PRACTITIONER

## 2019-01-01 PROCEDURE — 83036 HEMOGLOBIN GLYCOSYLATED A1C: CPT

## 2019-01-01 PROCEDURE — 85730 THROMBOPLASTIN TIME PARTIAL: CPT | Performed by: NURSE PRACTITIONER

## 2019-01-01 PROCEDURE — 85379 FIBRIN DEGRADATION QUANT: CPT | Performed by: EMERGENCY MEDICINE

## 2019-01-01 PROCEDURE — 93017 CV STRESS TEST TRACING ONLY: CPT

## 2019-01-01 PROCEDURE — 93306 TTE W/DOPPLER COMPLETE: CPT | Performed by: INTERNAL MEDICINE

## 2019-01-01 PROCEDURE — 99223 1ST HOSP IP/OBS HIGH 75: CPT | Performed by: THORACIC SURGERY (CARDIOTHORACIC VASCULAR SURGERY)

## 2019-01-01 PROCEDURE — 93880 EXTRACRANIAL BILAT STUDY: CPT

## 2019-01-01 PROCEDURE — 85730 THROMBOPLASTIN TIME PARTIAL: CPT | Performed by: EMERGENCY MEDICINE

## 2019-01-01 PROCEDURE — 85025 COMPLETE CBC W/AUTO DIFF WBC: CPT | Performed by: INTERNAL MEDICINE

## 2019-01-01 PROCEDURE — 81003 URINALYSIS AUTO W/O SCOPE: CPT | Performed by: NURSE PRACTITIONER

## 2019-01-01 PROCEDURE — 71045 X-RAY EXAM CHEST 1 VIEW: CPT

## 2019-01-01 PROCEDURE — 93306 TTE W/DOPPLER COMPLETE: CPT

## 2019-01-01 PROCEDURE — 86900 BLOOD TYPING SEROLOGIC ABO: CPT

## 2019-01-01 PROCEDURE — 02C03ZZ EXTIRPATION OF MATTER FROM CORONARY ARTERY, ONE ARTERY, PERCUTANEOUS APPROACH: ICD-10-PCS | Performed by: INTERNAL MEDICINE

## 2019-01-01 PROCEDURE — 0 IOPAMIDOL PER 1 ML: Performed by: INTERNAL MEDICINE

## 2019-01-01 DEVICE — XIENCE SIERRA™ EVEROLIMUS ELUTING CORONARY STENT SYSTEM 4.00 MM X 33 MM / RAPID-EXCHANGE
Type: IMPLANTABLE DEVICE | Status: FUNCTIONAL
Brand: XIENCE SIERRA™

## 2019-01-01 RX ORDER — LANCING DEVICE/LANCETS
1 KIT MISCELLANEOUS
COMMUNITY
End: 2019-01-01 | Stop reason: SDUPTHER

## 2019-01-01 RX ORDER — FENTANYL CITRATE 50 UG/ML
INJECTION, SOLUTION INTRAMUSCULAR; INTRAVENOUS AS NEEDED
Status: DISCONTINUED | OUTPATIENT
Start: 2019-01-01 | End: 2019-01-01 | Stop reason: HOSPADM

## 2019-01-01 RX ORDER — GABAPENTIN 300 MG/1
300 CAPSULE ORAL 3 TIMES DAILY
Status: DISCONTINUED | OUTPATIENT
Start: 2019-01-01 | End: 2019-01-01 | Stop reason: HOSPADM

## 2019-01-01 RX ORDER — LUBIPROSTONE 24 UG/1
24 CAPSULE ORAL 2 TIMES DAILY WITH MEALS
Qty: 30 CAPSULE | Refills: 0 | Status: SHIPPED | OUTPATIENT
Start: 2019-01-01 | End: 2020-01-01 | Stop reason: HOSPADM

## 2019-01-01 RX ORDER — CLOPIDOGREL BISULFATE 75 MG/1
75 TABLET ORAL DAILY
Status: DISCONTINUED | OUTPATIENT
Start: 2019-01-01 | End: 2019-01-01 | Stop reason: HOSPADM

## 2019-01-01 RX ORDER — DOCUSATE SODIUM 100 MG/1
100 CAPSULE, LIQUID FILLED ORAL 2 TIMES DAILY PRN
Status: DISCONTINUED | OUTPATIENT
Start: 2019-01-01 | End: 2019-01-01 | Stop reason: HOSPADM

## 2019-01-01 RX ORDER — ONDANSETRON 4 MG/1
4 TABLET, FILM COATED ORAL EVERY 6 HOURS PRN
Status: DISCONTINUED | OUTPATIENT
Start: 2019-01-01 | End: 2019-01-01 | Stop reason: HOSPADM

## 2019-01-01 RX ORDER — ERTUGLIFLOZIN 5 MG/1
TABLET, FILM COATED ORAL
Qty: 90 TABLET | Refills: 3 | Status: SHIPPED | OUTPATIENT
Start: 2019-01-01 | End: 2019-01-01 | Stop reason: SDUPTHER

## 2019-01-01 RX ORDER — BISACODYL 10 MG
10 SUPPOSITORY, RECTAL RECTAL DAILY PRN
Status: DISCONTINUED | OUTPATIENT
Start: 2019-01-01 | End: 2019-01-01 | Stop reason: HOSPADM

## 2019-01-01 RX ORDER — ATORVASTATIN CALCIUM 10 MG/1
TABLET, FILM COATED ORAL
Refills: 9 | COMMUNITY
Start: 2019-06-14 | End: 2019-01-01

## 2019-01-01 RX ORDER — ASPIRIN 325 MG
325 TABLET ORAL ONCE
Status: DISCONTINUED | OUTPATIENT
Start: 2019-01-01 | End: 2019-01-01 | Stop reason: HOSPADM

## 2019-01-01 RX ORDER — SODIUM CHLORIDE 9 MG/ML
100 INJECTION, SOLUTION INTRAVENOUS CONTINUOUS
Status: DISCONTINUED | OUTPATIENT
Start: 2019-01-01 | End: 2019-01-01 | Stop reason: HOSPADM

## 2019-01-01 RX ORDER — ASPIRIN 325 MG
325 TABLET, DELAYED RELEASE (ENTERIC COATED) ORAL DAILY
Status: DISCONTINUED | OUTPATIENT
Start: 2019-01-01 | End: 2019-01-01 | Stop reason: HOSPADM

## 2019-01-01 RX ORDER — BLOOD-GLUCOSE METER
KIT MISCELLANEOUS
Refills: 2 | COMMUNITY
Start: 2019-06-12 | End: 2019-01-01 | Stop reason: SDUPTHER

## 2019-01-01 RX ORDER — ACETAMINOPHEN 325 MG/1
650 TABLET ORAL EVERY 4 HOURS PRN
Status: DISCONTINUED | OUTPATIENT
Start: 2019-01-01 | End: 2019-01-01 | Stop reason: HOSPADM

## 2019-01-01 RX ORDER — ASPIRIN 81 MG/1
81 TABLET ORAL DAILY
Status: DISCONTINUED | OUTPATIENT
Start: 2019-01-01 | End: 2019-01-01

## 2019-01-01 RX ORDER — HEPARIN SODIUM 1000 [USP'U]/ML
INJECTION, SOLUTION INTRAVENOUS; SUBCUTANEOUS AS NEEDED
Status: DISCONTINUED | OUTPATIENT
Start: 2019-01-01 | End: 2019-01-01 | Stop reason: HOSPADM

## 2019-01-01 RX ORDER — MAGNESIUM SULFATE HEPTAHYDRATE 40 MG/ML
2 INJECTION, SOLUTION INTRAVENOUS AS NEEDED
Status: DISCONTINUED | OUTPATIENT
Start: 2019-01-01 | End: 2019-01-01 | Stop reason: HOSPADM

## 2019-01-01 RX ORDER — QUETIAPINE FUMARATE 200 MG/1
200 TABLET, FILM COATED ORAL NIGHTLY
COMMUNITY
End: 2020-01-01

## 2019-01-01 RX ORDER — ZOLPIDEM TARTRATE 5 MG/1
TABLET ORAL
Refills: 0 | COMMUNITY
Start: 2019-05-28 | End: 2019-01-01

## 2019-01-01 RX ORDER — BLOOD-GLUCOSE METER
KIT MISCELLANEOUS
Qty: 200 EACH | Refills: 3 | Status: SHIPPED | OUTPATIENT
Start: 2019-01-01 | End: 2019-01-01

## 2019-01-01 RX ORDER — GLIMEPIRIDE 2 MG/1
TABLET ORAL
Qty: 180 TABLET | Refills: 3 | Status: SHIPPED | OUTPATIENT
Start: 2019-01-01 | End: 2019-01-01 | Stop reason: SDUPTHER

## 2019-01-01 RX ORDER — GABAPENTIN 100 MG/1
100 CAPSULE ORAL 3 TIMES DAILY
Qty: 90 CAPSULE | Refills: 2 | Status: SHIPPED | OUTPATIENT
Start: 2019-01-01 | End: 2019-01-01

## 2019-01-01 RX ORDER — ONDANSETRON 2 MG/ML
4 INJECTION INTRAMUSCULAR; INTRAVENOUS EVERY 6 HOURS PRN
Status: CANCELLED | OUTPATIENT
Start: 2019-01-01

## 2019-01-01 RX ORDER — SODIUM CHLORIDE 0.9 % (FLUSH) 0.9 %
10 SYRINGE (ML) INJECTION EVERY 12 HOURS SCHEDULED
Status: DISCONTINUED | OUTPATIENT
Start: 2019-01-01 | End: 2019-01-01 | Stop reason: HOSPADM

## 2019-01-01 RX ORDER — ATORVASTATIN CALCIUM 10 MG/1
10 TABLET, FILM COATED ORAL NIGHTLY
Status: DISCONTINUED | OUTPATIENT
Start: 2019-01-01 | End: 2019-01-01

## 2019-01-01 RX ORDER — NITROGLYCERIN 0.4 MG/1
0.4 TABLET SUBLINGUAL
Status: DISCONTINUED | OUTPATIENT
Start: 2019-01-01 | End: 2019-01-01 | Stop reason: HOSPADM

## 2019-01-01 RX ORDER — LANCETS 28 GAUGE
EACH MISCELLANEOUS
Qty: 200 EACH | Refills: 3 | Status: SHIPPED | OUTPATIENT
Start: 2019-01-01 | End: 2019-01-01

## 2019-01-01 RX ORDER — GLIMEPIRIDE 2 MG/1
2 TABLET ORAL 2 TIMES DAILY
COMMUNITY
End: 2020-01-01

## 2019-01-01 RX ORDER — ALUMINA, MAGNESIA, AND SIMETHICONE 2400; 2400; 240 MG/30ML; MG/30ML; MG/30ML
15 SUSPENSION ORAL EVERY 6 HOURS PRN
Status: DISCONTINUED | OUTPATIENT
Start: 2019-01-01 | End: 2019-01-01 | Stop reason: HOSPADM

## 2019-01-01 RX ORDER — QUETIAPINE FUMARATE 200 MG/1
TABLET, FILM COATED ORAL
Qty: 30 TABLET | Refills: 2 | Status: SHIPPED | OUTPATIENT
Start: 2019-01-01 | End: 2019-01-01 | Stop reason: SDUPTHER

## 2019-01-01 RX ORDER — INSULIN LISPRO PROTAMIN/LISPRO 75-25/ML
VIAL (ML) SUBCUTANEOUS
Qty: 50 ML | Refills: 6 | Status: SHIPPED | OUTPATIENT
Start: 2019-01-01 | End: 2019-01-01

## 2019-01-01 RX ORDER — METRONIDAZOLE 500 MG/1
500 TABLET ORAL 2 TIMES DAILY
Refills: 0 | COMMUNITY
Start: 2019-04-23 | End: 2019-01-01

## 2019-01-01 RX ORDER — ATORVASTATIN CALCIUM 10 MG/1
10 TABLET, FILM COATED ORAL NIGHTLY
COMMUNITY

## 2019-01-01 RX ORDER — LORAZEPAM 2 MG/ML
1 INJECTION INTRAMUSCULAR ONCE
Status: COMPLETED | OUTPATIENT
Start: 2019-01-01 | End: 2019-01-01

## 2019-01-01 RX ORDER — INSULIN LISPRO PROTAMIN/LISPRO 75-25/ML
VIAL (ML) SUBCUTANEOUS
Qty: 40 ML | Refills: 6 | Status: SHIPPED | OUTPATIENT
Start: 2019-01-01 | End: 2019-01-01

## 2019-01-01 RX ORDER — LANCETS 28 GAUGE
EACH MISCELLANEOUS
Refills: 2 | COMMUNITY
Start: 2019-06-23 | End: 2019-01-01 | Stop reason: SDUPTHER

## 2019-01-01 RX ORDER — ACYCLOVIR 400 MG/1
TABLET ORAL
Refills: 0 | COMMUNITY
Start: 2019-05-28 | End: 2019-01-01

## 2019-01-01 RX ORDER — QUETIAPINE FUMARATE 200 MG/1
TABLET, FILM COATED ORAL
Qty: 30 TABLET | Refills: 2 | Status: SHIPPED | OUTPATIENT
Start: 2019-01-01 | End: 2019-01-01

## 2019-01-01 RX ORDER — ATORVASTATIN CALCIUM 40 MG/1
40 TABLET, FILM COATED ORAL NIGHTLY
Status: DISCONTINUED | OUTPATIENT
Start: 2019-01-01 | End: 2019-01-01 | Stop reason: HOSPADM

## 2019-01-01 RX ORDER — SODIUM CHLORIDE 9 MG/ML
INJECTION, SOLUTION INTRAVENOUS CONTINUOUS PRN
Status: COMPLETED | OUTPATIENT
Start: 2019-01-01 | End: 2019-01-01

## 2019-01-01 RX ORDER — ERTUGLIFLOZIN 5 MG/1
TABLET, FILM COATED ORAL
Qty: 90 TABLET | Refills: 3 | Status: SHIPPED | OUTPATIENT
Start: 2019-01-01 | End: 2019-01-01

## 2019-01-01 RX ORDER — GLIMEPIRIDE 2 MG/1
TABLET ORAL
Qty: 180 TABLET | Refills: 3 | Status: SHIPPED | OUTPATIENT
Start: 2019-01-01 | End: 2019-01-01

## 2019-01-01 RX ORDER — ERTUGLIFLOZIN 5 MG/1
TABLET, FILM COATED ORAL
Refills: 2 | COMMUNITY
Start: 2019-06-24 | End: 2019-01-01 | Stop reason: SDUPTHER

## 2019-01-01 RX ORDER — ALPRAZOLAM 0.25 MG/1
0.25 TABLET ORAL EVERY 8 HOURS PRN
Status: DISCONTINUED | OUTPATIENT
Start: 2019-01-01 | End: 2019-01-01 | Stop reason: HOSPADM

## 2019-01-01 RX ORDER — ATORVASTATIN CALCIUM 40 MG/1
40 TABLET, FILM COATED ORAL NIGHTLY
Status: DISCONTINUED | OUTPATIENT
Start: 2019-01-01 | End: 2019-01-01 | Stop reason: SDUPTHER

## 2019-01-01 RX ORDER — SODIUM CHLORIDE 0.9 % (FLUSH) 0.9 %
30 SYRINGE (ML) INJECTION ONCE AS NEEDED
Status: CANCELLED | OUTPATIENT
Start: 2019-01-01

## 2019-01-01 RX ORDER — INSULIN LISPRO PROTAMIN/LISPRO 75-25/ML
VIAL (ML) SUBCUTANEOUS
Qty: 40 ML | Refills: 6 | Status: SHIPPED | OUTPATIENT
Start: 2019-01-01 | End: 2019-01-01 | Stop reason: SDUPTHER

## 2019-01-01 RX ORDER — MIDAZOLAM HYDROCHLORIDE 1 MG/ML
INJECTION INTRAMUSCULAR; INTRAVENOUS AS NEEDED
Status: DISCONTINUED | OUTPATIENT
Start: 2019-01-01 | End: 2019-01-01 | Stop reason: HOSPADM

## 2019-01-01 RX ORDER — ONDANSETRON 2 MG/ML
4 INJECTION INTRAMUSCULAR; INTRAVENOUS EVERY 6 HOURS PRN
Status: DISCONTINUED | OUTPATIENT
Start: 2019-01-01 | End: 2019-01-01 | Stop reason: HOSPADM

## 2019-01-01 RX ORDER — SODIUM CHLORIDE 9 MG/ML
250 INJECTION, SOLUTION INTRAVENOUS ONCE AS NEEDED
Status: DISCONTINUED | OUTPATIENT
Start: 2019-01-01 | End: 2019-01-01 | Stop reason: HOSPADM

## 2019-01-01 RX ORDER — QUETIAPINE FUMARATE 100 MG/1
200 TABLET, FILM COATED ORAL NIGHTLY
Status: DISCONTINUED | OUTPATIENT
Start: 2019-01-01 | End: 2019-01-01 | Stop reason: HOSPADM

## 2019-01-01 RX ORDER — FLUTICASONE PROPIONATE 50 MCG
2 SPRAY, SUSPENSION (ML) NASAL DAILY
Status: DISCONTINUED | OUTPATIENT
Start: 2019-01-01 | End: 2019-01-01 | Stop reason: HOSPADM

## 2019-01-01 RX ORDER — CLONAZEPAM 0.5 MG/1
0.5 TABLET ORAL 2 TIMES DAILY PRN
Status: DISCONTINUED | OUTPATIENT
Start: 2019-01-01 | End: 2019-01-01 | Stop reason: HOSPADM

## 2019-01-01 RX ORDER — NITROGLYCERIN 5 MG/ML
INJECTION, SOLUTION INTRAVENOUS AS NEEDED
Status: DISCONTINUED | OUTPATIENT
Start: 2019-01-01 | End: 2019-01-01 | Stop reason: HOSPADM

## 2019-01-01 RX ORDER — CLOPIDOGREL BISULFATE 75 MG/1
75 TABLET ORAL DAILY
Qty: 30 TABLET | Refills: 3 | Status: SHIPPED | OUTPATIENT
Start: 2020-01-01 | End: 2020-01-01

## 2019-01-01 RX ORDER — BUPRENORPHINE AND NALOXONE 2; .5 MG/1; MG/1
2 FILM, SOLUBLE BUCCAL; SUBLINGUAL DAILY
COMMUNITY
Start: 2017-10-14 | End: 2019-01-01

## 2019-01-01 RX ORDER — SODIUM CHLORIDE 9 MG/ML
30 INJECTION, SOLUTION INTRAVENOUS CONTINUOUS PRN
Status: CANCELLED | OUTPATIENT
Start: 2019-01-01

## 2019-01-01 RX ORDER — INSULIN LISPRO PROTAMIN/LISPRO 75-25/ML
VIAL (ML) SUBCUTANEOUS
Qty: 20 ML | Refills: 2 | Status: SHIPPED | OUTPATIENT
Start: 2019-01-01 | End: 2019-01-01

## 2019-01-01 RX ORDER — FLUCONAZOLE 150 MG/1
TABLET ORAL
Refills: 0 | COMMUNITY
Start: 2019-04-18 | End: 2019-01-01

## 2019-01-01 RX ORDER — DOCUSATE SODIUM 100 MG/1
100 CAPSULE, LIQUID FILLED ORAL 2 TIMES DAILY
Status: DISCONTINUED | OUTPATIENT
Start: 2019-01-01 | End: 2019-01-01 | Stop reason: HOSPADM

## 2019-01-01 RX ORDER — CEFAZOLIN SODIUM IN 0.9 % NACL 3 G/100 ML
3 INTRAVENOUS SOLUTION, PIGGYBACK (ML) INTRAVENOUS ONCE
Status: CANCELLED | OUTPATIENT
Start: 2019-01-01

## 2019-01-01 RX ORDER — DOCUSATE SODIUM 100 MG/1
CAPSULE, LIQUID FILLED ORAL
Refills: 2 | COMMUNITY
Start: 2019-06-23 | End: 2019-01-01

## 2019-01-01 RX ORDER — METOPROLOL SUCCINATE 25 MG/1
25 TABLET, EXTENDED RELEASE ORAL
Status: DISCONTINUED | OUTPATIENT
Start: 2019-01-01 | End: 2019-01-01 | Stop reason: SDUPTHER

## 2019-01-01 RX ORDER — NICOTINE POLACRILEX 4 MG
15 LOZENGE BUCCAL
Status: DISCONTINUED | OUTPATIENT
Start: 2019-01-01 | End: 2019-01-01 | Stop reason: HOSPADM

## 2019-01-01 RX ORDER — QUETIAPINE FUMARATE 100 MG/1
TABLET, FILM COATED ORAL
Qty: 30 TABLET | Refills: 0 | Status: SHIPPED | OUTPATIENT
Start: 2019-01-01 | End: 2019-01-01 | Stop reason: SDUPTHER

## 2019-01-01 RX ORDER — INSULIN LISPRO PROTAMIN/LISPRO 75-25/ML
VIAL (ML) SUBCUTANEOUS
Qty: 10 ML | Refills: 2 | Status: SHIPPED | OUTPATIENT
Start: 2019-01-01 | End: 2019-01-01

## 2019-01-01 RX ORDER — LUBIPROSTONE 24 UG/1
24 CAPSULE ORAL 2 TIMES DAILY WITH MEALS
Status: DISCONTINUED | OUTPATIENT
Start: 2019-01-01 | End: 2019-01-01 | Stop reason: HOSPADM

## 2019-01-01 RX ORDER — METFORMIN HYDROCHLORIDE 500 MG/1
TABLET, EXTENDED RELEASE ORAL
Refills: 4 | COMMUNITY
Start: 2019-06-05 | End: 2019-01-01 | Stop reason: SDUPTHER

## 2019-01-01 RX ORDER — INSULIN LISPRO PROTAMIN/LISPRO 75-25/ML
VIAL (ML) SUBCUTANEOUS
Qty: 60 ML | Refills: 5 | Status: SHIPPED | OUTPATIENT
Start: 2019-01-01 | End: 2019-01-01

## 2019-01-01 RX ORDER — BLOOD-GLUCOSE METER
KIT MISCELLANEOUS
Refills: 0 | COMMUNITY
Start: 2019-04-25 | End: 2019-01-01

## 2019-01-01 RX ORDER — CHOLECALCIFEROL (VITAMIN D3) 125 MCG
5 CAPSULE ORAL NIGHTLY PRN
Status: DISCONTINUED | OUTPATIENT
Start: 2019-01-01 | End: 2019-01-01 | Stop reason: HOSPADM

## 2019-01-01 RX ORDER — LANCING DEVICE/LANCETS
KIT MISCELLANEOUS
Qty: 1 KIT | Refills: 0 | Status: SHIPPED | OUTPATIENT
Start: 2019-01-01 | End: 2019-01-01 | Stop reason: CLARIF

## 2019-01-01 RX ORDER — LIDOCAINE HYDROCHLORIDE 20 MG/ML
INJECTION, SOLUTION INFILTRATION; PERINEURAL AS NEEDED
Status: DISCONTINUED | OUTPATIENT
Start: 2019-01-01 | End: 2019-01-01 | Stop reason: HOSPADM

## 2019-01-01 RX ORDER — BLOOD-GLUCOSE METER
1 EACH MISCELLANEOUS DAILY
Qty: 1 KIT | Refills: 1 | Status: SHIPPED | OUTPATIENT
Start: 2019-01-01 | End: 2019-01-01

## 2019-01-01 RX ORDER — CLONAZEPAM 0.5 MG/1
0.5 TABLET ORAL 2 TIMES DAILY PRN
Qty: 60 TABLET | Refills: 2 | Status: SHIPPED | OUTPATIENT
Start: 2019-01-01 | End: 2020-01-01

## 2019-01-01 RX ORDER — DEXTROSE MONOHYDRATE 25 G/50ML
25 INJECTION, SOLUTION INTRAVENOUS
Status: DISCONTINUED | OUTPATIENT
Start: 2019-01-01 | End: 2019-01-01 | Stop reason: HOSPADM

## 2019-01-01 RX ORDER — LISINOPRIL AND HYDROCHLOROTHIAZIDE 20; 12.5 MG/1; MG/1
TABLET ORAL DAILY
Refills: 2 | COMMUNITY
Start: 2019-06-23 | End: 2019-01-01

## 2019-01-01 RX ORDER — NEEDLES, SAFETY 22GX1 1/2"
NEEDLE, DISPOSABLE MISCELLANEOUS
Qty: 60 EACH | Refills: 1 | Status: SHIPPED | OUTPATIENT
Start: 2019-01-01 | End: 2019-01-01

## 2019-01-01 RX ORDER — ACETAMINOPHEN 160 MG/5ML
650 SOLUTION ORAL EVERY 4 HOURS PRN
Status: DISCONTINUED | OUTPATIENT
Start: 2019-01-01 | End: 2019-01-01 | Stop reason: HOSPADM

## 2019-01-01 RX ORDER — INSULIN LISPRO 100 [IU]/ML
78 INJECTION, SUSPENSION SUBCUTANEOUS 2 TIMES DAILY WITH MEALS
Status: DISCONTINUED | OUTPATIENT
Start: 2019-01-01 | End: 2019-01-01 | Stop reason: SDUPTHER

## 2019-01-01 RX ORDER — SODIUM CHLORIDE 0.9 % (FLUSH) 0.9 %
10 SYRINGE (ML) INJECTION AS NEEDED
Status: DISCONTINUED | OUTPATIENT
Start: 2019-01-01 | End: 2019-01-01 | Stop reason: HOSPADM

## 2019-01-01 RX ORDER — MAGNESIUM SULFATE 1 G/100ML
1 INJECTION INTRAVENOUS AS NEEDED
Status: DISCONTINUED | OUTPATIENT
Start: 2019-01-01 | End: 2019-01-01 | Stop reason: HOSPADM

## 2019-01-01 RX ORDER — NICOTINE 21 MG/24HR
1 PATCH, TRANSDERMAL 24 HOURS TRANSDERMAL EVERY 24 HOURS
Status: DISCONTINUED | OUTPATIENT
Start: 2019-01-01 | End: 2019-01-01 | Stop reason: HOSPADM

## 2019-01-01 RX ORDER — CHLORHEXIDINE GLUCONATE 500 MG/1
1 CLOTH TOPICAL EVERY 12 HOURS
Status: ACTIVE | OUTPATIENT
Start: 2019-01-01 | End: 2019-01-01

## 2019-01-01 RX ORDER — DIPHENHYDRAMINE HYDROCHLORIDE 50 MG/ML
50 INJECTION INTRAMUSCULAR; INTRAVENOUS ONCE
Status: CANCELLED | OUTPATIENT
Start: 2019-01-01 | End: 2019-01-01

## 2019-01-01 RX ORDER — NEEDLES, SAFETY 22GX1 1/2"
NEEDLE, DISPOSABLE MISCELLANEOUS
COMMUNITY
End: 2019-01-01 | Stop reason: SDUPTHER

## 2019-01-01 RX ORDER — POTASSIUM CHLORIDE 20 MEQ/1
40 TABLET, EXTENDED RELEASE ORAL AS NEEDED
Status: DISCONTINUED | OUTPATIENT
Start: 2019-01-01 | End: 2019-01-01 | Stop reason: HOSPADM

## 2019-01-01 RX ORDER — GABAPENTIN 300 MG/1
300 CAPSULE ORAL 3 TIMES DAILY
COMMUNITY

## 2019-01-01 RX ORDER — METHADONE HYDROCHLORIDE 10 MG/1
70 TABLET ORAL DAILY
Status: DISCONTINUED | OUTPATIENT
Start: 2019-01-01 | End: 2019-01-01 | Stop reason: HOSPADM

## 2019-01-01 RX ORDER — ATROPINE SULFATE 1 MG/ML
0.5 INJECTION, SOLUTION INTRAMUSCULAR; INTRAVENOUS; SUBCUTANEOUS
Status: DISCONTINUED | OUTPATIENT
Start: 2019-01-01 | End: 2019-01-01 | Stop reason: HOSPADM

## 2019-01-01 RX ORDER — METFORMIN HYDROCHLORIDE 500 MG/1
TABLET, EXTENDED RELEASE ORAL
Qty: 180 TABLET | Refills: 3 | Status: SHIPPED | OUTPATIENT
Start: 2019-01-01 | End: 2019-01-01

## 2019-01-01 RX ORDER — LISINOPRIL 30 MG/1
30 TABLET ORAL DAILY
COMMUNITY

## 2019-01-01 RX ORDER — GLIPIZIDE 5 MG/1
5 TABLET ORAL
Status: DISCONTINUED | OUTPATIENT
Start: 2019-01-01 | End: 2019-01-01

## 2019-01-01 RX ORDER — NITROGLYCERIN 0.4 MG/1
0.4 TABLET SUBLINGUAL
Qty: 30 TABLET | Refills: 0 | Status: SHIPPED | OUTPATIENT
Start: 2019-01-01 | End: 2020-01-01 | Stop reason: SDUPTHER

## 2019-01-01 RX ORDER — GLIMEPIRIDE 2 MG/1
TABLET ORAL
Refills: 4 | COMMUNITY
Start: 2019-06-05 | End: 2019-01-01 | Stop reason: SDUPTHER

## 2019-01-01 RX ORDER — OMEPRAZOLE 20 MG/1
20 CAPSULE, DELAYED RELEASE ORAL DAILY
Refills: 2 | COMMUNITY

## 2019-01-01 RX ORDER — CHLORHEXIDINE GLUCONATE 0.12 MG/ML
15 RINSE ORAL EVERY 12 HOURS SCHEDULED
Status: DISCONTINUED | OUTPATIENT
Start: 2019-01-01 | End: 2019-01-01

## 2019-01-01 RX ORDER — QUETIAPINE FUMARATE 100 MG/1
TABLET, FILM COATED ORAL
Qty: 30 TABLET | Refills: 2 | Status: SHIPPED | OUTPATIENT
Start: 2019-01-01 | End: 2019-01-01

## 2019-01-01 RX ORDER — PANTOPRAZOLE SODIUM 40 MG/1
40 TABLET, DELAYED RELEASE ORAL EVERY MORNING
Status: DISCONTINUED | OUTPATIENT
Start: 2019-01-01 | End: 2019-01-01 | Stop reason: HOSPADM

## 2019-01-01 RX ORDER — CHLORHEXIDINE GLUCONATE 0.12 MG/ML
15 RINSE ORAL EVERY 12 HOURS
Status: DISPENSED | OUTPATIENT
Start: 2019-01-01 | End: 2019-01-01

## 2019-01-01 RX ORDER — DOCUSATE SODIUM 100 MG/1
100 CAPSULE, LIQUID FILLED ORAL 2 TIMES DAILY PRN
COMMUNITY

## 2019-01-01 RX ORDER — SODIUM CHLORIDE, SODIUM LACTATE, POTASSIUM CHLORIDE, CALCIUM CHLORIDE 600; 310; 30; 20 MG/100ML; MG/100ML; MG/100ML; MG/100ML
125 INJECTION, SOLUTION INTRAVENOUS CONTINUOUS
Status: DISPENSED | OUTPATIENT
Start: 2019-01-01 | End: 2019-01-01

## 2019-01-01 RX ORDER — FLUTICASONE PROPIONATE 50 MCG
2 SPRAY, SUSPENSION (ML) NASAL DAILY
Refills: 2 | COMMUNITY

## 2019-01-01 RX ORDER — POTASSIUM CHLORIDE 1.5 G/1.77G
40 POWDER, FOR SOLUTION ORAL AS NEEDED
Status: DISCONTINUED | OUTPATIENT
Start: 2019-01-01 | End: 2019-01-01 | Stop reason: HOSPADM

## 2019-01-01 RX ORDER — INSULIN LISPRO PROTAMIN/LISPRO 75-25/ML
VIAL (ML) SUBCUTANEOUS
Qty: 60 ML | Refills: 5 | Status: SHIPPED | OUTPATIENT
Start: 2019-01-01 | End: 2019-01-01 | Stop reason: SDUPTHER

## 2019-01-01 RX ORDER — ASPIRIN 325 MG
325 TABLET ORAL ONCE
Status: COMPLETED | OUTPATIENT
Start: 2019-01-01 | End: 2019-01-01

## 2019-01-01 RX ORDER — BISACODYL 5 MG/1
5 TABLET, DELAYED RELEASE ORAL DAILY PRN
Status: DISCONTINUED | OUTPATIENT
Start: 2019-01-01 | End: 2019-01-01 | Stop reason: HOSPADM

## 2019-01-01 RX ORDER — ACETAMINOPHEN 650 MG/1
650 SUPPOSITORY RECTAL EVERY 4 HOURS PRN
Status: DISCONTINUED | OUTPATIENT
Start: 2019-01-01 | End: 2019-01-01 | Stop reason: HOSPADM

## 2019-01-01 RX ORDER — LANCETS
EACH MISCELLANEOUS
Qty: 100 EACH | Refills: 5 | Status: SHIPPED | OUTPATIENT
Start: 2019-01-01 | End: 2019-01-01

## 2019-01-01 RX ADMIN — GABAPENTIN 300 MG: 300 CAPSULE ORAL at 15:07

## 2019-01-01 RX ADMIN — GABAPENTIN 300 MG: 300 CAPSULE ORAL at 20:20

## 2019-01-01 RX ADMIN — METOPROLOL TARTRATE 25 MG: 25 TABLET ORAL at 08:12

## 2019-01-01 RX ADMIN — ASPIRIN 325 MG: 325 TABLET, DELAYED RELEASE ORAL at 10:40

## 2019-01-01 RX ADMIN — POLYETHYLENE GLYCOL 3350 17 G: 17 POWDER, FOR SOLUTION ORAL at 08:50

## 2019-01-01 RX ADMIN — GABAPENTIN 300 MG: 300 CAPSULE ORAL at 15:15

## 2019-01-01 RX ADMIN — METOPROLOL TARTRATE 25 MG: 25 TABLET ORAL at 20:37

## 2019-01-01 RX ADMIN — LUBIPROSTONE 24 MCG: 24 CAPSULE, GELATIN COATED ORAL at 17:03

## 2019-01-01 RX ADMIN — NITROGLYCERIN 0.4 MG: 0.4 TABLET SUBLINGUAL at 11:21

## 2019-01-01 RX ADMIN — METHADONE HYDROCHLORIDE 70 MG: 10 TABLET ORAL at 08:12

## 2019-01-01 RX ADMIN — LUBIPROSTONE 24 MCG: 24 CAPSULE, GELATIN COATED ORAL at 17:21

## 2019-01-01 RX ADMIN — PANTOPRAZOLE SODIUM 40 MG: 40 TABLET, DELAYED RELEASE ORAL at 08:26

## 2019-01-01 RX ADMIN — BISACODYL 10 MG: 10 SUPPOSITORY RECTAL at 06:15

## 2019-01-01 RX ADMIN — CLOPIDOGREL BISULFATE 75 MG: 75 TABLET ORAL at 08:18

## 2019-01-01 RX ADMIN — Medication 10 ML: at 10:41

## 2019-01-01 RX ADMIN — TECHNETIUM TC 99M SESTAMIBI 1 DOSE: 1 INJECTION INTRAVENOUS at 11:25

## 2019-01-01 RX ADMIN — CLONAZEPAM 0.5 MG: 0.5 TABLET ORAL at 20:47

## 2019-01-01 RX ADMIN — ALPRAZOLAM 0.25 MG: 0.25 TABLET ORAL at 21:25

## 2019-01-01 RX ADMIN — Medication 10 ML: at 08:07

## 2019-01-01 RX ADMIN — GABAPENTIN 300 MG: 300 CAPSULE ORAL at 08:05

## 2019-01-01 RX ADMIN — ATORVASTATIN CALCIUM 40 MG: 40 TABLET, FILM COATED ORAL at 20:28

## 2019-01-01 RX ADMIN — FLUTICASONE PROPIONATE 2 SPRAY: 50 SPRAY, METERED NASAL at 08:18

## 2019-01-01 RX ADMIN — Medication 10 ML: at 20:51

## 2019-01-01 RX ADMIN — LUBIPROSTONE 24 MCG: 24 CAPSULE, GELATIN COATED ORAL at 17:47

## 2019-01-01 RX ADMIN — INSULIN LISPRO 6 UNITS: 100 INJECTION, SOLUTION INTRAVENOUS; SUBCUTANEOUS at 08:07

## 2019-01-01 RX ADMIN — METOPROLOL TARTRATE 25 MG: 25 TABLET ORAL at 08:05

## 2019-01-01 RX ADMIN — GABAPENTIN 300 MG: 300 CAPSULE ORAL at 20:37

## 2019-01-01 RX ADMIN — INSULIN LISPRO 16 UNITS: 100 INJECTION, SOLUTION INTRAVENOUS; SUBCUTANEOUS at 17:26

## 2019-01-01 RX ADMIN — QUETIAPINE 200 MG: 100 TABLET, FILM COATED ORAL at 20:33

## 2019-01-01 RX ADMIN — GABAPENTIN 300 MG: 300 CAPSULE ORAL at 20:41

## 2019-01-01 RX ADMIN — CLONAZEPAM 0.5 MG: 0.5 TABLET ORAL at 20:37

## 2019-01-01 RX ADMIN — METOPROLOL TARTRATE 25 MG: 25 TABLET ORAL at 08:08

## 2019-01-01 RX ADMIN — GABAPENTIN 300 MG: 300 CAPSULE ORAL at 08:32

## 2019-01-01 RX ADMIN — INSULIN LISPRO 6 UNITS: 100 INJECTION, SOLUTION INTRAVENOUS; SUBCUTANEOUS at 11:39

## 2019-01-01 RX ADMIN — LUBIPROSTONE 24 MCG: 24 CAPSULE, GELATIN COATED ORAL at 08:11

## 2019-01-01 RX ADMIN — INSULIN LISPRO 3 UNITS: 100 INJECTION, SOLUTION INTRAVENOUS; SUBCUTANEOUS at 17:03

## 2019-01-01 RX ADMIN — INSULIN LISPRO 12 UNITS: 100 INJECTION, SOLUTION INTRAVENOUS; SUBCUTANEOUS at 08:18

## 2019-01-01 RX ADMIN — PANTOPRAZOLE SODIUM 40 MG: 40 TABLET, DELAYED RELEASE ORAL at 08:11

## 2019-01-01 RX ADMIN — METOPROLOL TARTRATE 12.5 MG: 25 TABLET ORAL at 09:22

## 2019-01-01 RX ADMIN — POLYETHYLENE GLYCOL 3350 17 G: 17 POWDER, FOR SOLUTION ORAL at 10:38

## 2019-01-01 RX ADMIN — POLYETHYLENE GLYCOL 3350 17 G: 17 POWDER, FOR SOLUTION ORAL at 20:28

## 2019-01-01 RX ADMIN — Medication 10 ML: at 20:43

## 2019-01-01 RX ADMIN — METHADONE HYDROCHLORIDE 70 MG: 10 TABLET ORAL at 08:17

## 2019-01-01 RX ADMIN — ASPIRIN 325 MG: 325 TABLET, DELAYED RELEASE ORAL at 08:18

## 2019-01-01 RX ADMIN — QUETIAPINE 200 MG: 100 TABLET, FILM COATED ORAL at 20:37

## 2019-01-01 RX ADMIN — Medication 10 ML: at 08:27

## 2019-01-01 RX ADMIN — PANTOPRAZOLE SODIUM 40 MG: 40 TABLET, DELAYED RELEASE ORAL at 08:08

## 2019-01-01 RX ADMIN — DOCUSATE SODIUM 100 MG: 100 CAPSULE, LIQUID FILLED ORAL at 08:18

## 2019-01-01 RX ADMIN — ASPIRIN 325 MG: 325 TABLET, DELAYED RELEASE ORAL at 08:50

## 2019-01-01 RX ADMIN — INSULIN LISPRO 78 UNITS: 100 INJECTION, SUSPENSION SUBCUTANEOUS at 10:39

## 2019-01-01 RX ADMIN — DOCUSATE SODIUM 100 MG: 100 CAPSULE, LIQUID FILLED ORAL at 21:22

## 2019-01-01 RX ADMIN — QUETIAPINE 200 MG: 100 TABLET, FILM COATED ORAL at 21:24

## 2019-01-01 RX ADMIN — CLONAZEPAM 0.5 MG: 0.5 TABLET ORAL at 08:38

## 2019-01-01 RX ADMIN — DOCUSATE SODIUM 100 MG: 100 CAPSULE, LIQUID FILLED ORAL at 20:41

## 2019-01-01 RX ADMIN — METOPROLOL TARTRATE 25 MG: 25 TABLET ORAL at 21:24

## 2019-01-01 RX ADMIN — INSULIN LISPRO 3 UNITS: 100 INJECTION, SOLUTION INTRAVENOUS; SUBCUTANEOUS at 20:59

## 2019-01-01 RX ADMIN — PANTOPRAZOLE SODIUM 40 MG: 40 TABLET, DELAYED RELEASE ORAL at 06:21

## 2019-01-01 RX ADMIN — FLUTICASONE PROPIONATE 2 SPRAY: 50 SPRAY, METERED NASAL at 10:41

## 2019-01-01 RX ADMIN — Medication 10 ML: at 08:18

## 2019-01-01 RX ADMIN — METHADONE HYDROCHLORIDE 70 MG: 10 TABLET ORAL at 08:38

## 2019-01-01 RX ADMIN — METOPROLOL TARTRATE 25 MG: 25 TABLET ORAL at 08:50

## 2019-01-01 RX ADMIN — GABAPENTIN 300 MG: 300 CAPSULE ORAL at 08:15

## 2019-01-01 RX ADMIN — LUBIPROSTONE 24 MCG: 24 CAPSULE, GELATIN COATED ORAL at 17:24

## 2019-01-01 RX ADMIN — DOCUSATE SODIUM 100 MG: 100 CAPSULE, LIQUID FILLED ORAL at 08:08

## 2019-01-01 RX ADMIN — INSULIN LISPRO 4 UNITS: 100 INJECTION, SOLUTION INTRAVENOUS; SUBCUTANEOUS at 20:41

## 2019-01-01 RX ADMIN — METHADONE HYDROCHLORIDE 70 MG: 10 TABLET ORAL at 08:50

## 2019-01-01 RX ADMIN — GABAPENTIN 300 MG: 300 CAPSULE ORAL at 15:11

## 2019-01-01 RX ADMIN — INSULIN LISPRO 78 UNITS: 100 INJECTION, SUSPENSION SUBCUTANEOUS at 17:42

## 2019-01-01 RX ADMIN — CLONAZEPAM 0.5 MG: 0.5 TABLET ORAL at 08:23

## 2019-01-01 RX ADMIN — QUETIAPINE 200 MG: 100 TABLET, FILM COATED ORAL at 20:51

## 2019-01-01 RX ADMIN — INSULIN LISPRO 78 UNITS: 100 INJECTION, SUSPENSION SUBCUTANEOUS at 17:21

## 2019-01-01 RX ADMIN — METOPROLOL SUCCINATE 25 MG: 25 TABLET, EXTENDED RELEASE ORAL at 10:06

## 2019-01-01 RX ADMIN — SODIUM CHLORIDE 100 ML/HR: 900 INJECTION, SOLUTION INTRAVENOUS at 15:11

## 2019-01-01 RX ADMIN — Medication 10 ML: at 08:13

## 2019-01-01 RX ADMIN — METHADONE HYDROCHLORIDE 70 MG: 10 TABLET ORAL at 08:15

## 2019-01-01 RX ADMIN — DOCUSATE SODIUM 100 MG: 100 CAPSULE, LIQUID FILLED ORAL at 08:12

## 2019-01-01 RX ADMIN — GABAPENTIN 300 MG: 300 CAPSULE ORAL at 20:28

## 2019-01-01 RX ADMIN — INSULIN LISPRO 12 UNITS: 100 INJECTION, SOLUTION INTRAVENOUS; SUBCUTANEOUS at 21:22

## 2019-01-01 RX ADMIN — INSULIN LISPRO 78 UNITS: 100 INJECTION, SUSPENSION SUBCUTANEOUS at 09:51

## 2019-01-01 RX ADMIN — Medication 10 ML: at 20:41

## 2019-01-01 RX ADMIN — CLONAZEPAM 0.5 MG: 0.5 TABLET ORAL at 20:51

## 2019-01-01 RX ADMIN — METOPROLOL TARTRATE 25 MG: 25 TABLET ORAL at 20:13

## 2019-01-01 RX ADMIN — METOPROLOL TARTRATE 25 MG: 25 TABLET ORAL at 20:39

## 2019-01-01 RX ADMIN — ASPIRIN 325 MG ORAL TABLET 325 MG: 325 PILL ORAL at 11:20

## 2019-01-01 RX ADMIN — INSULIN LISPRO 4 UNITS: 100 INJECTION, SOLUTION INTRAVENOUS; SUBCUTANEOUS at 21:23

## 2019-01-01 RX ADMIN — Medication 10 ML: at 21:24

## 2019-01-01 RX ADMIN — CLONAZEPAM 0.5 MG: 0.5 TABLET ORAL at 20:43

## 2019-01-01 RX ADMIN — METOPROLOL TARTRATE 12.5 MG: 25 TABLET ORAL at 20:31

## 2019-01-01 RX ADMIN — METHADONE HYDROCHLORIDE 70 MG: 10 TABLET ORAL at 09:28

## 2019-01-01 RX ADMIN — REGADENOSON 0.4 MG: 0.08 INJECTION, SOLUTION INTRAVENOUS at 11:25

## 2019-01-01 RX ADMIN — LUBIPROSTONE 24 MCG: 24 CAPSULE, GELATIN COATED ORAL at 08:07

## 2019-01-01 RX ADMIN — METHADONE HYDROCHLORIDE 70 MG: 10 TABLET ORAL at 08:05

## 2019-01-01 RX ADMIN — METOPROLOL TARTRATE 25 MG: 25 TABLET ORAL at 10:40

## 2019-01-01 RX ADMIN — INSULIN LISPRO 12 UNITS: 100 INJECTION, SOLUTION INTRAVENOUS; SUBCUTANEOUS at 17:24

## 2019-01-01 RX ADMIN — GABAPENTIN 300 MG: 300 CAPSULE ORAL at 08:50

## 2019-01-01 RX ADMIN — GABAPENTIN 300 MG: 300 CAPSULE ORAL at 10:48

## 2019-01-01 RX ADMIN — TECHNETIUM TC 99M SESTAMIBI 1 DOSE: 1 INJECTION INTRAVENOUS at 06:50

## 2019-01-01 RX ADMIN — POLYETHYLENE GLYCOL 3350 17 G: 17 POWDER, FOR SOLUTION ORAL at 08:12

## 2019-01-01 RX ADMIN — GABAPENTIN 300 MG: 300 CAPSULE ORAL at 08:12

## 2019-01-01 RX ADMIN — QUETIAPINE 200 MG: 100 TABLET, FILM COATED ORAL at 20:13

## 2019-01-01 RX ADMIN — ASPIRIN 325 MG: 325 TABLET, DELAYED RELEASE ORAL at 08:26

## 2019-01-01 RX ADMIN — SODIUM CHLORIDE 500 ML: 0.9 INJECTION, SOLUTION INTRAVENOUS at 13:39

## 2019-01-01 RX ADMIN — METOPROLOL TARTRATE 25 MG: 25 TABLET ORAL at 20:51

## 2019-01-01 RX ADMIN — CLONAZEPAM 0.5 MG: 0.5 TABLET ORAL at 20:31

## 2019-01-01 RX ADMIN — GABAPENTIN 300 MG: 300 CAPSULE ORAL at 20:51

## 2019-01-01 RX ADMIN — POLYETHYLENE GLYCOL 3350 17 G: 17 POWDER, FOR SOLUTION ORAL at 09:23

## 2019-01-01 RX ADMIN — QUETIAPINE 200 MG: 100 TABLET, FILM COATED ORAL at 20:41

## 2019-01-01 RX ADMIN — ATORVASTATIN CALCIUM 40 MG: 40 TABLET, FILM COATED ORAL at 20:41

## 2019-01-01 RX ADMIN — LUBIPROSTONE 24 MCG: 24 CAPSULE, GELATIN COATED ORAL at 10:47

## 2019-01-01 RX ADMIN — Medication 10 ML: at 20:37

## 2019-01-01 RX ADMIN — DOCUSATE SODIUM 100 MG: 100 CAPSULE, LIQUID FILLED ORAL at 10:40

## 2019-01-01 RX ADMIN — CLONAZEPAM 0.5 MG: 0.5 TABLET ORAL at 14:22

## 2019-01-01 RX ADMIN — PANTOPRAZOLE SODIUM 40 MG: 40 TABLET, DELAYED RELEASE ORAL at 08:02

## 2019-01-01 RX ADMIN — GABAPENTIN 300 MG: 300 CAPSULE ORAL at 20:13

## 2019-01-01 RX ADMIN — FLUTICASONE PROPIONATE 2 SPRAY: 50 SPRAY, METERED NASAL at 09:51

## 2019-01-01 RX ADMIN — INSULIN LISPRO 16 UNITS: 100 INJECTION, SOLUTION INTRAVENOUS; SUBCUTANEOUS at 20:39

## 2019-01-01 RX ADMIN — ATORVASTATIN CALCIUM 40 MG: 40 TABLET, FILM COATED ORAL at 21:21

## 2019-01-01 RX ADMIN — GABAPENTIN 300 MG: 300 CAPSULE ORAL at 17:03

## 2019-01-01 RX ADMIN — PANTOPRAZOLE SODIUM 40 MG: 40 TABLET, DELAYED RELEASE ORAL at 06:04

## 2019-01-01 RX ADMIN — METHADONE HYDROCHLORIDE 70 MG: 10 TABLET ORAL at 10:40

## 2019-01-01 RX ADMIN — POLYETHYLENE GLYCOL 3350 17 G: 17 POWDER, FOR SOLUTION ORAL at 17:04

## 2019-01-01 RX ADMIN — Medication 10 ML: at 21:23

## 2019-01-01 RX ADMIN — ATORVASTATIN CALCIUM 40 MG: 40 TABLET, FILM COATED ORAL at 20:13

## 2019-01-01 RX ADMIN — CLONAZEPAM 0.5 MG: 0.5 TABLET ORAL at 08:05

## 2019-01-01 RX ADMIN — LORAZEPAM 1 MG: 2 INJECTION INTRAMUSCULAR; INTRAVENOUS at 13:33

## 2019-01-01 RX ADMIN — QUETIAPINE 200 MG: 100 TABLET, FILM COATED ORAL at 20:43

## 2019-01-01 RX ADMIN — Medication 10 ML: at 20:14

## 2019-01-01 RX ADMIN — CHLORHEXIDINE GLUCONATE 1 APPLICATION: 500 CLOTH TOPICAL at 08:14

## 2019-01-01 RX ADMIN — ATORVASTATIN CALCIUM 40 MG: 40 TABLET, FILM COATED ORAL at 20:43

## 2019-01-01 RX ADMIN — MELATONIN TAB 5 MG 5 MG: 5 TAB at 20:43

## 2019-01-01 RX ADMIN — INSULIN LISPRO 4 UNITS: 100 INJECTION, SOLUTION INTRAVENOUS; SUBCUTANEOUS at 12:31

## 2019-01-01 RX ADMIN — POLYETHYLENE GLYCOL 3350 17 G: 17 POWDER, FOR SOLUTION ORAL at 06:15

## 2019-01-01 RX ADMIN — ATORVASTATIN CALCIUM 40 MG: 40 TABLET, FILM COATED ORAL at 20:37

## 2019-01-01 RX ADMIN — QUETIAPINE 200 MG: 100 TABLET, FILM COATED ORAL at 20:20

## 2019-01-01 RX ADMIN — FLUTICASONE PROPIONATE 2 SPRAY: 50 SPRAY, METERED NASAL at 08:27

## 2019-01-01 RX ADMIN — Medication 10 ML: at 08:50

## 2019-01-01 RX ADMIN — PANTOPRAZOLE SODIUM 40 MG: 40 TABLET, DELAYED RELEASE ORAL at 08:18

## 2019-01-01 RX ADMIN — GABAPENTIN 300 MG: 300 CAPSULE ORAL at 21:23

## 2019-01-01 RX ADMIN — LUBIPROSTONE 24 MCG: 24 CAPSULE, GELATIN COATED ORAL at 08:18

## 2019-01-01 RX ADMIN — METOPROLOL TARTRATE 25 MG: 25 TABLET ORAL at 08:25

## 2019-01-01 RX ADMIN — INSULIN LISPRO 78 UNITS: 100 INJECTION, SUSPENSION SUBCUTANEOUS at 17:24

## 2019-01-01 RX ADMIN — INSULIN LISPRO 3 UNITS: 100 INJECTION, SOLUTION INTRAVENOUS; SUBCUTANEOUS at 14:23

## 2019-01-01 RX ADMIN — METOPROLOL TARTRATE 25 MG: 25 TABLET ORAL at 20:41

## 2019-01-01 RX ADMIN — CLONAZEPAM 0.5 MG: 0.5 TABLET ORAL at 21:25

## 2019-01-01 RX ADMIN — PANTOPRAZOLE SODIUM 40 MG: 40 TABLET, DELAYED RELEASE ORAL at 08:32

## 2019-01-01 RX ADMIN — FLUTICASONE PROPIONATE 2 SPRAY: 50 SPRAY, METERED NASAL at 08:13

## 2019-01-01 RX ADMIN — INSULIN LISPRO 78 UNITS: 100 INJECTION, SUSPENSION SUBCUTANEOUS at 08:18

## 2019-01-01 RX ADMIN — INSULIN LISPRO 2 UNITS: 100 INJECTION, SOLUTION INTRAVENOUS; SUBCUTANEOUS at 17:44

## 2019-01-01 RX ADMIN — GABAPENTIN 300 MG: 300 CAPSULE ORAL at 08:25

## 2019-01-01 RX ADMIN — ATORVASTATIN CALCIUM 40 MG: 40 TABLET, FILM COATED ORAL at 20:51

## 2019-01-01 RX ADMIN — ASPIRIN 325 MG: 325 TABLET, DELAYED RELEASE ORAL at 20:13

## 2019-01-01 RX ADMIN — ASPIRIN 325 MG: 325 TABLET, DELAYED RELEASE ORAL at 08:11

## 2019-01-01 RX ADMIN — CLONAZEPAM 0.5 MG: 0.5 TABLET ORAL at 20:41

## 2019-01-01 RX ADMIN — GABAPENTIN 300 MG: 300 CAPSULE ORAL at 08:18

## 2019-01-01 RX ADMIN — ASPIRIN 325 MG: 325 TABLET, DELAYED RELEASE ORAL at 08:07

## 2019-01-01 RX ADMIN — POLYETHYLENE GLYCOL 3350 17 G: 17 POWDER, FOR SOLUTION ORAL at 08:07

## 2019-01-01 RX ADMIN — Medication 10 ML: at 20:21

## 2019-01-01 RX ADMIN — Medication 10 ML: at 08:08

## 2019-01-30 ENCOUNTER — HOSPITAL ENCOUNTER (OUTPATIENT)
Dept: PHYSICAL THERAPY | Facility: HOSPITAL | Age: 54
Setting detail: RECURRING SERIES
Discharge: HOME | End: 2019-04-14
Attending: NURSE PRACTITIONER | Admitting: NURSE PRACTITIONER

## 2019-06-05 ENCOUNTER — CONVERSION ENCOUNTER (OUTPATIENT)
Dept: ENDOCRINOLOGY | Facility: CLINIC | Age: 54
End: 2019-06-05

## 2019-06-05 ENCOUNTER — HOSPITAL ENCOUNTER (OUTPATIENT)
Dept: LAB | Facility: HOSPITAL | Age: 54
Setting detail: SPECIMEN
Discharge: HOME | End: 2019-06-05
Attending: INTERNAL MEDICINE | Admitting: INTERNAL MEDICINE

## 2019-06-06 LAB
25(OH)D3 SERPL-MCNC: 28 NG/ML (ref 30–100)
HBA1C MFR BLD: 8.8 % (ref 0–5.6)

## 2019-06-13 VITALS
SYSTOLIC BLOOD PRESSURE: 108 MMHG | OXYGEN SATURATION: 94 % | HEART RATE: 98 BPM | HEIGHT: 68 IN | BODY MASS INDEX: 40.92 KG/M2 | WEIGHT: 270 LBS | DIASTOLIC BLOOD PRESSURE: 72 MMHG

## 2019-06-26 ENCOUNTER — TELEPHONE (OUTPATIENT)
Dept: ENDOCRINOLOGY | Facility: CLINIC | Age: 54
End: 2019-06-26

## 2019-06-26 NOTE — TELEPHONE ENCOUNTER
PATIENT CALLED AND LEFT MESSAGE THAT DUE TO UNFORSEEN CIRCUMSTANCES SHE WAS UNABLE TO GET A RIDE TO THE EDUCATION CLASS THIS MORNING..I WILL CALL HER TO GET RE-SCHEDULED

## 2019-07-10 PROBLEM — R56.9 SEIZURES (HCC): Status: ACTIVE | Noted: 2019-01-01

## 2019-07-10 PROBLEM — I10 HYPERTENSION: Status: ACTIVE | Noted: 2019-06-05

## 2019-07-10 PROBLEM — E55.9 VITAMIN D DEFICIENCY: Status: ACTIVE | Noted: 2019-06-05

## 2019-07-10 PROBLEM — E78.2 HYPERLIPIDEMIA, MIXED: Status: ACTIVE | Noted: 2019-06-05

## 2019-07-10 PROBLEM — B18.2 HEP C W/O COMA, CHRONIC (HCC): Status: ACTIVE | Noted: 2017-10-11

## 2019-07-10 PROBLEM — E11.65 TYPE 2 DIABETES MELLITUS WITH HYPERGLYCEMIA (HCC): Status: ACTIVE | Noted: 2019-06-05

## 2019-07-10 PROBLEM — F19.10: Status: ACTIVE | Noted: 2017-10-10
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
## 2019-07-10 NOTE — PATIENT INSTRUCTIONS
Mixed Bipolar Disorder  Mixed bipolar disorder is a mental health disorder in which a person has episodes of emotional highs (glenys), lows (depression), or both of these feelings at the same time. People with this disorder have very big mood changes (mood swings) that happen quickly on a regular basis. These episodes may be severe enough to cause problems with relationships, school, or work. In some cases, they can cause the person to be unsafe, and the person may need to be hospitalized.  What are the causes?  The cause of this condition is not known.  What increases the risk?  The following factors may make you more likely to develop this condition:  · Having a family history of the disorder.  · Abusing substances such as alcohol or drugs.  · Having an anxiety disorder.  · Having another illness, such as heart disease or thyroid disease.    What are the signs or symptoms?  Symptoms of this condition include having episodes of glenys, depression, and sometimes symptoms of both at the same time. For instance, you may feel sad and full of energy at the same time. You may have mood swings almost every day.  Symptoms of glenys may include:  · Very high self-esteem or self-confidence.  · Being unusually talkative, or feeling a need to keep talking. Speech may be very fast. It may seem like you cannot stop talking.  · Racing thoughts or constant talking, with quick shifts between topics that may or may not be related (flight of ideas).  · Decreased ability to focus or concentrate.  · Increased purposeful activity, such as work, study, or social activity.  · Increased nonproductive activity. This could be pacing, squirming and fidgeting, or finger and toe tapping.  · Impulsive behavior and poor judgment. This may result in high-risk activities, such as having unprotected sex or spending a lot of money.    Symptoms of depression may include:  · Feeling sad, hopeless, or helpless.  · Lack of feeling or caring about  anything.  · Not being able to enjoy things that you used to enjoy.  · Trouble concentrating or remembering.  · Trouble making decisions.  · Thoughts of death, or desire to harm yourself.    How is this diagnosed?  This condition may be diagnosed based on:  · Your symptoms and medical history. Your health care provider will ask about your emotional episodes.  · A physical exam. This is done to rule out any health problems that may be causing symptoms. Your health care provider will also ask about your alcohol and drug use.    How is this treated?  Bipolar disorder is a long-term (chronic) illness. It is best controlled with treatment that is given on an ongoing basis, rather than only when symptoms are present. Treatment may include:  · Medicine. Medicine can be prescribed by a provider who specializes in treating mental disorders (psychiatrist).  ? Medicines called mood stabilizers, antipsychotics, or antidepressants may be prescribed.  ? If symptoms occur even while one type of medicine is taken, other medicines may be added.  · Psychotherapy. Some forms of talk therapy, such as cognitive behavioral therapy (CBT), can provide support, education, and guidance.  · Coping methods, such as journaling or relaxation exercises. These may include:  ? Yoga.  ? Meditation.  ? Deep breathing.  · Lifestyle changes, such as:  ? Limiting alcohol and drug use.  ? Exercising regularly.  ? Getting plenty of sleep.  ? Making healthy eating choices.    A combination of medicine, talk therapy, and coping methods is best. In severe cases, if other treatments do not work, a procedure may be used to change the brain chemicals that send messages between brain cells (neurotransmitters). This procedure, called electroconvulsive therapy (ECT), applies short electrical pulses to the brain through the scalp.  Follow these instructions at home:  Activity  · Return to your normal activities as told by your health care provider.  · Find  activities that you enjoy, and make time to do them.  · Exercise regularly as told by your health care provider.  Lifestyle  · Follow a set schedule for eating and sleeping.  · Eat a balanced diet that includes fresh fruits and vegetables, whole grains, low-fat dairy products, and lean meats.  · Get 7-8 or more hours of sleep each night.  · Do not drink alcohol or use illegal drugs.  General instructions  · Take over-the-counter and prescription medicines only as told by your health care provider.  · Think about joining a support group. Your health care provider may be able to recommend a group for you.  · Talk with your family and loved ones about your treatment goals and about how they can help.  · Keep all follow-up visits as told by your health care provider. This is important.  Contact a health care provider if:  · Your symptoms get worse.  · You have side effects from your medicine.  · You have trouble sleeping.  · You have trouble doing daily activities.  · You feel unsafe in your surroundings.  · You are dealing with substance abuse.  Get help right away if:  · You think about hurting yourself or you try to hurt yourself.  · You think about suicide.  If you ever feel like you may hurt yourself or others, or have thoughts about taking your own life, get help right away. You can go to your nearest emergency department or call:  · Your local emergency services (911 in the [place]  · A suicide crisis helpline, such as the National Suicide Prevention Lifeline at 1-199.561.5224. This is open 24 hours a day.    Summary  · Mixed bipolar disorder is a mental health disorder in which a person has episodes of emotional highs (glenys), lows (depression), or both of these feelings at the same time.  · Bipolar disorder is a long-term (chronic) illness. It is best controlled with treatment that is given on an ongoing basis, rather than only when symptoms are present.  · A combination of medicine, talk therapy, and coping  methods is best for treating this condition.  This information is not intended to replace advice given to you by your health care provider. Make sure you discuss any questions you have with your health care provider.  Document Released: 04/13/2018 Document Revised: 04/13/2018 Document Reviewed: 04/13/2018  Elsevier Interactive Patient Education © 2019 [employer]

## 2019-08-21 NOTE — TELEPHONE ENCOUNTER
Patient states that she wants to go on gabapentin for nerve pain and PCP will not prescribe it. She is tapering off methadone and is going to have them fax us a statement

## 2019-08-21 NOTE — TELEPHONE ENCOUNTER
PCP states that it is a conflict with being a pain med and psych med so he thinks she needs to go through one or the other

## 2019-08-22 NOTE — TELEPHONE ENCOUNTER
But according to the message, she wants neurontin for pain. Right?  So pain management or PCP should prescribe it

## 2019-09-05 NOTE — TELEPHONE ENCOUNTER
Patient called very concerned. She stated she needs an appointment and can't wait until October. She stated she has sores on her legs, she has leg pain her sugars are in the 300-400s and can't keep it under control, she has had to get 2 different glasses because her eyes are getting worse. She stated she can't afford good food because she is on food stamps. She said her ride didn't show up with she needed to come in for her education. I have rescheduled her for an individual assessment on 09/11/19. She is very concerned her DM is getting worse. I informed her she can call in her blood sugars to the educators and have adjustments made. Please advise is there is anything she can do.

## 2019-09-11 NOTE — PROGRESS NOTES
Pt seen for Initial Diabetes Education. Pt seen from 9:40-10:29am.  Pt stated she didn't feel so well and had been running high. BG this am 296 and increased her Glimepiride dose to 4mg am and 4mg pm.  Asked her to call Bg next week and  scheduled Class 1 for 9/27.  Pt feeling like she may need insulin but willing to try increasing meds first.

## 2019-09-11 NOTE — PROGRESS NOTES
Pt seen for Diabetes Education Initial Assessment. Pt seen from 9:40-10:29am   Pt originally scheduled in June and couldn't get here.   today. Pt states she doesn't feel well dry moth,tired.  Increased Glimepiride  To 4mg BID and asked her to check BG readings at least two/day and call us next week.  Scheduled for Class 1 9/27

## 2019-09-27 NOTE — PROGRESS NOTES
Pt here for Class 1 Diabetes Education 8-11am Pt was restless and discussed taking Methedone and how that affects BG. And left early before break and then got up at 11 am and said she would be back next Wednesday. She was not signed up for Class 2 but plans to be back.

## 2019-10-09 NOTE — TELEPHONE ENCOUNTER
Pt called with [facility]. She does not want insulin doesn't know why it spiked when it has been better.  Suggested she drink extra water which she said she was and add another 5 mg Steglatro to see if it lower BG. She will call and consider insulin if she cannot get it down.  Rx sent and BG scanned to [email or web address]

## 2019-10-11 PROBLEM — F43.12 CHRONIC POSTTRAUMATIC STRESS DISORDER: Chronic | Status: ACTIVE | Noted: 2019-01-01

## 2019-10-11 PROBLEM — F31.64 BIPOLAR AFFECTIVE DISORDER, MIXED, SEVERE, WITH PSYCHOTIC BEHAVIOR (HCC): Chronic | Status: ACTIVE | Noted: 2019-01-01
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
## 2019-10-11 NOTE — PATIENT INSTRUCTIONS
Bipolar 1 Disorder  Bipolar 1 disorder is a mental health disorder in which a person has episodes of emotional highs (glenys), and may also have episodes of emotional lows (depression) in addition to highs. Bipolar 1 disorder is different from other bipolar disorders because it involves extreme manic episodes. These episodes last at least one week or involve symptoms that are so severe that hospitalization is needed to keep the person safe.  What increases the risk?  The cause of this condition is not known. However, certain factors make you more likely to have bipolar disorder, such as:  · Having a family member with the disorder.  · An imbalance of certain chemicals in the brain (neurotransmitters).  · Stress, such as illness, financial problems, or a death.  · Certain conditions that affect the brain or spinal cord (neurologic conditions).  · Brain injury (trauma).  · Having another mental health disorder, such as:  ? Obsessive compulsive disorder.  ? Schizophrenia.  What are the signs or symptoms?  Symptoms of glenys include:  · Very high self-esteem or self-confidence.  · Decreased need for sleep.  · Unusual talkativeness or feeling a need to keep talking. Speech may be very fast. It may seem like you cannot stop talking.  · Racing thoughts or constant talking, with quick shifts between topics that may or may not be related (flight of ideas).  · Decreased ability to focus or concentrate.  · Increased purposeful activity, such as work, studies, or social activity.  · Increased nonproductive activity. This could be pacing, squirming and fidgeting, or finger and toe tapping.  · Impulsive behavior and poor judgment. This may result in high-risk activities, such as having unprotected sex or spending a lot of money.  Symptoms of depression include:  · Feeling sad, hopeless, or helpless.  · Frequent or uncontrollable crying.  · Lack of feeling or caring about anything.  · Sleeping too much.  · Moving more slowly than  usual.  · Not being able to enjoy things you used to enjoy.  · Wanting to be alone all the time.  · Feeling guilty or worthless.  · Lack of energy or motivation.  · Trouble concentrating or remembering.  · Trouble making decisions.  · Increased appetite.  · Thoughts of death, or the desire to harm yourself.  Sometimes, you may have a mixed mood. This means having symptoms of depression and glenys. Stress can make symptoms worse.  How is this diagnosed?  To diagnose bipolar disorder, your health care provider may ask about your:  · Emotional episodes.  · Medical history.  · Alcohol and drug use. This includes prescription medicines. Certain medical conditions and substances can cause symptoms that seem like bipolar disorder (secondary bipolar disorder).  How is this treated?  Bipolar disorder is a long-term (chronic) illness. It is best controlled with ongoing (continuous) treatment rather than treatment only when symptoms occur. Treatment may include:  · Medicine. Medicine can be prescribed by a provider who specializes in treating mental disorders (psychiatrist).  ? Medicines called mood stabilizers are usually prescribed.  ? If symptoms occur even while taking a mood stabilizer, other medicines may be added.  · Psychotherapy. Some forms of talk therapy, such as cognitive-behavioral therapy (CBT), can provide support, education, and guidance.  · Coping methods, such as journaling or relaxation exercises. These may include:  ? Yoga.  ? Meditation.  ? Deep breathing.  · Lifestyle changes, such as:  ? Limiting alcohol and drug use.  ? Exercising regularly.  ? Getting plenty of sleep.  ? Making healthy eating choices.    A combination of medicine, talk therapy, and coping methods is best. A procedure in which electricity is applied to the brain through the scalp (electroconvulsive therapy) may be used in cases of severe glenys when medicine and psychotherapy work too slowly or do not work.  Follow these instructions at  home:  Activity    · Return to your normal activities as told by your health care provider.  · Find activities that you enjoy, and make time to do them.  · Exercise regularly as told by your health care provider.  Lifestyle  · Limit alcohol intake to no more than 1 drink a day for nonpregnant women and 2 drinks a day for men. One drink equals 12 oz of beer, 5 oz of wine, or 1½ oz of hard liquor.  · Follow a set schedule for eating and sleeping.  · Eat a balanced diet that includes fresh fruits and vegetables, whole grains, low-fat dairy, and lean meat.  · Get 7-8 hours of sleep each night.  General instructions  · Take over-the-counter and prescription medicines only as told by your health care provider.  · Think about joining a support group. Your health care provider may be able to recommend a support group.  · Talk with your family and loved ones about your treatment goals and how they can help.  · Keep all follow-up visits as told by your health care provider. This is important.  Where to find more information  For more information about bipolar disorder, visit the following websites:  · National [place] on Mental Illness: www.sahil.org  · [place] National [place] of Mental Health: www.nimh.nih.gov  Contact a health care provider if:  · Your symptoms get worse.  · You have side effects from your medicine, and they get worse.  · You have trouble sleeping.  · You have trouble doing daily activities.  · You feel unsafe in your surroundings.  · You are dealing with substance abuse.  Get help right away if:  · You have new symptoms.  · You have thoughts about harming yourself.  · You self-harm.  This information is not intended to replace advice given to you by your health care provider. Make sure you discuss any questions you have with your health care provider.  Document Released: 03/26/2002 Document Revised: 08/13/2017 Document Reviewed: 08/17/2017  Elsevier Interactive Patient Education © 2019 [employer]

## 2019-10-15 NOTE — TELEPHONE ENCOUNTER
Pt called c/o BG's not going lower than 400-500 even though she added another 5 mg Steglatro and drinking a lot of water.  She states she is continuing to take her Glimepiride 2 tabs BID and Metformin BID. Pt very upset and wants insulin to be sent to her pharmacy.  Pt states she already knows how to draw up insulin via vial and syringe.  Please advise which insulin and doses to send in. Thank you!

## 2019-10-15 NOTE — TELEPHONE ENCOUNTER
I had talked to pt last night & wrote a note about using Humalog 75/25, including the dose.  I did not know she could do insulin injections. So I had put an order for DM education.  She called again the answering service just now. I told her I was sending the Rx for the insulin & syringes.  Sent to her pharmacy.

## 2019-10-15 NOTE — TELEPHONE ENCOUNTER
PATIENT CALLED ABOUT HER BLOOD SUGARS BEING HIGH  I TRIED TO REACH AN EDUCATOR  NO ONE AROUND. SO I TRIED TO CONTACTED AN MA EITHER MARILYN OR FRANCO. I SPOKE WITH FRANCO AND SHE TOLD ME TO TRANSFER THE PATIENT TO THE EDUCATION LINE 3515 SO SHE CAN LET AN EDUCATOR KNOW OF THE PATIENT'S HIGH NUMBERS SO THEY CAN RETURN THE PATIENT'S CALL AND IF PATIENT HAS NUMBERS WRITTEN DOWN SHE CAN RECORD THEM THROUGH THE 2119 BLOOD SUGAR LINE AS WELL. I CALLED THE PATIENT BACK TO MAKE SURE SHE WAS ABLE TO LEAVE A MESSAGE TO THE 9315 LINE AND THAT I WOULD BE REACHING OUT TO DR. LAWRENCE 'S MA ABOUT THE CALL AS WELL.

## 2019-10-15 NOTE — PROGRESS NOTES
Called the answ service with blood sugar over 500.  Took extra dose of Steglatro & metformin.  Says she is scared. Ready to start insulin.  DM educ order placed for insulin teaching.

## 2019-10-17 NOTE — TELEPHONE ENCOUNTER
Neither Steglatro nor glimepiride cause nausea @ any dose. So increasing the dose should not matter.  High blood sugar can cause nausea, but it is apparently responding to the new insulin Rx.  Metformin, on the other hand, can & does cause nausea / vomiting & is dose dependent.  Therefore, I recommend she stops metformin for a few days to see how she does.

## 2019-10-17 NOTE — TELEPHONE ENCOUNTER
Patient called and states that everytime at this time of the day for the last two weeks, that she has been getting really sick to her stomach and at times even throwing up. She states that she has had a few medication changes and doesn't know if it has something to do with that. She states her glimepiride and steglatro have been doubled and thinks possibly its this? I asked about her blood sugars when this happens and she states that right now it was 236. She states that it normally is higher than that, but today is better. She isn't sure what the sick to stomach could be and just thinks it medication changes. Please advise.

## 2019-10-17 NOTE — TELEPHONE ENCOUNTER
Called pt and she states she got her Humalog Mix 75/25 and has been injecting it BID without any problems.  Pt states her BG's have come down out of the 500-600 range to 200-300 range.  Pt asked if her dose could be increased.  Per MD s/o, increased her am dose to 22 and pm dose to 11 units.  Asked pt to send in BG's early next week for further adjustments.  Pt also asked if a new SoftClix lancet device rx be sent due to her device being broken.  Rx sent.

## 2019-11-05 NOTE — TELEPHONE ENCOUNTER
She is scheduled for 12/4/19 at 8:30 am -pt was notified of time and date.  She is not happy about this appointment. She did miss her appointment on 11/05/19

## 2019-11-13 NOTE — PROGRESS NOTES
[facility]        Patient Care Team:  Lyell, Reggie Duane, MD as PCP - General    Chief Complaint:    Chief Complaint   Patient presents with   • Diabetes     type 2         Subjective   Here for diabetes f/u  Blood sugars: in the 200-300's  Exercise program: not much  Not taking atorvastatin. Afraid of side effects  Not taking vit D    Interval History:     Patient Complaints: none  Patient Denies:  hypoglycemia  History taken from: patient    Review of Systems:   Review of Systems   Eyes: Negative for blurred vision.   Gastrointestinal: Negative for nausea.   Endocrine: Negative for polyuria.   Neurological: Negative for headache.     Gained 2 lb since last visit    Objective     Vital Signs  Heart Rate:  [116] 116  BP: (142)/(82) 142/82    Physical Exam:     General Appearance:    Alert, cooperative, in no acute distress. Obese   Head:    Normocephalic, without obvious abnormality, atraumatic   Eyes:            Lids and lashes normal, conjunctivae and sclerae normal, no   icterus, no pallor, corneas clear, PERRLA   Throat:   No oral lesions,  oral mucosa moist   Neck:   No adenopathy, supple,  no thyromegaly, no   carotid bruit   Lungs:     Decreased breath sounds    Heart:    Regular rhythm and normal rate   Chest Wall:    No abnormalities observed   Abdomen:     Normal bowel sounds, soft, obese                 Extremities:   Moves all extremities well, no edema               Pulses:   Pulses palpable and equal bilaterally   Skin:   Dry   Neurologic:  DTR 1+, able to feel the 10g monofilament          Results Review:    I have reviewed the patient's new clinical results, labs & imaging.    Medication Review:   Prior to Admission medications    Medication Sig Start Date End Date Taking? Authorizing Provider   fluticasone (FLONASE) 50 MCG/ACT nasal spray SPRAY 2 SPRAYS EACH NOSTRIL EVERY DAY 5/22/19  Yes Provider, MD Nimisha   gabapentin (NEURONTIN) 100 MG capsule Take 1 capsule by mouth  3 (Three) Times a Day.  Patient taking differently: Take 300 mg by mouth 3 (Three) Times a Day. 10/11/19 10/10/20 Yes Luly Bernard MD   glimepiride (AMARYL) 2 MG tablet Take 2 tablets po with breakfast and 2 tablets po with supper 10/9/19  Yes Aris Cao MD   glucose blood test strip 1 each by Other route. Use as instructed ac[address]-che[address] lane plus twice daily   Yes Nimisha Andrews MD   HUMALOG MIX 75/25 (75-25) 100 UNIT/ML suspension injection Inject 40 units ac breakfast & 25 units ac supper daily 11/12/19  Yes Aris Cao MD   Insulin Syringes, Disposable, U-100 0.5 ML misc Use for insulin injections bid 10/15/19  Yes Aris Cao MD   Lancets Misc. (ACCU-CHEK SOFTCLIX LANCET DEV) kit Pt to use to check her blood sugars 10/17/19  Yes Aris Cao MD   lisinopril-hydrochlorothiazide (PRINZIDE,ZESTORETIC) 20-12.5 MG per tablet Take  by mouth Daily. 6/23/19  Yes Nimisha Andrews MD   methadone 1 mg/ml oral suspension Take 70 mg by mouth. 6/5/19  Yes Nimisha Andrews MD   omeprazole (priLOSEC) 40 MG capsule TK ONE C PO QD 6/23/19  Yes Nimisha Andrews MD   QUEtiapine (SEROquel) 200 MG tablet 1 tab po QHS 10/11/19  Yes Luly Bernard MD   STEGLATRO 5 MG tablet Take  2 tablets once daily 10/9/19  Yes Aris Cao MD   HUMALOG MIX 75/25 (75-25) 100 UNIT/ML suspension injection Inject 28 units ac breakfast & 15 units ac supper daily  Patient taking differently: Inject 30 units ac breakfast & 15 units ac supper daily 10/28/19 11/12/19 Yes Aris Cao MD   acyclovir (ZOVIRAX) 400 MG tablet TK 1 T PO TID 5/28/19 11/12/19  Nimisha Andrews MD   atorvastatin (LIPITOR) 10 MG tablet TK 1 T PO QHS 6/14/19 11/12/19  Nimisha Andrews MD   Blood Glucose Monitoring Suppl (FREESTYLE LITE) device U TO CHECK BLOOD SUGAR 4/25/19 11/12/19  Provider, Nimisha, MD    MG capsule TK ONE C PO BID 6/23/19 11/12/19  Provider, Historical, MD   fluconazole  (DIFLUCAN) 150 MG tablet TK 1 T PO ONCE 4/18/19 11/12/19  Nimisha Andrews MD   FREESTYLE LITE test strip Use to check blood sugar twice daily dx:e11.65 7/22/19 11/12/19  Aris Cao MD   ibuprofen (ADVIL) 100 MG tablet Take 100 mg by mouth.  11/12/19  Nimisha Andrews MD   Lancets (FREESTYLE) lancets Use to check blood sugar twice daily dx:e11.65 7/22/19 11/12/19  Aris Cao MD   metFORMIN (GLUCOPHAGE) 500 MG tablet TK 2 TS PO BID WC 6/14/19 11/12/19  Nimisha Andrews MD   metFORMIN ER (GLUCOPHAGE-XR) 500 MG 24 hr tablet Take twice daily 7/22/19 11/12/19  Aris Cao MD   metroNIDAZOLE (FLAGYL) 500 MG tablet Take 500 mg by mouth 2 (Two) Times a Day. 4/23/19 11/12/19  Nimisha Andrews MD   zolpidem (AMBIEN) 5 MG tablet TK 1 T PO QD HS PRN 5/28/19 11/12/19  Nimisha Andrews MD       Lab Results (most recent)     Procedure Component Value Units Date/Time    POC Glucose Fingerstick [[id number]]  (Abnormal) Collected:  11/12/19 1239    Specimen:  Blood Updated:  11/12/19 1242     Glucose 281 mg/dL      Comment: ate@6am this morning, 6.5 hours ago, 6am this morning               Lab Results   Component Value Date    HGBA1C 9.2 (H) 10/29/2019    HGBA1C 8.8 (H) 06/05/2019      Lab Results   Component Value Date    GLUCOSE 200 (H) 10/29/2019    BUN 16 10/29/2019    CREATININE 1.07 (H) 10/29/2019    EGFRIFNONA 54 (L) 10/29/2019    BCR 15.0 10/29/2019    K 4.1 10/29/2019    CO2 24.1 10/29/2019    CALCIUM 9.3 10/29/2019    ALBUMIN 4.50 10/29/2019    AST 59 (H) 10/29/2019    ALT 62 (H) 10/29/2019    CHOL 224 (H) 10/29/2019     (H) 10/29/2019    HDL 40 10/29/2019    TRIG 170 (H) 10/29/2019     Lab Results   Component Value Date    TSH 2.180 10/29/2019    [id number] 28 (L) 06/05/2019       Assessment/Plan     [place] was seen today for diabetes.    Diagnoses and all orders for this visit:    Type 2 diabetes mellitus with hyperglycemia, unspecified whether long term insulin use  (CMS/[facility])  -     POC Glucose Fingerstick  -     Hemoglobin A1c; Future    Vitamin D deficiency  -     Vitamin D 25 Hydroxy; Future    Hyperlipidemia, mixed    Other orders  -     HUMALOG MIX 75/25 (75-25) 100 UNIT/ML suspension injection; Inject 40 units ac breakfast & 25 units ac supper daily        Increase Humalog 75/25 to 40 units before breakfast, 25 units before supper.  Call if blood sugars are running under 100 or over 200.  Take atorvastatin once week.  Take vit D 2,000 units daily.  Increase exercise as able.  Drink plenty of water.          Aris Cao MD  11/12/19  8:10 PM

## 2019-11-19 NOTE — TELEPHONE ENCOUNTER
PATIENT IS COMING OFF METHADONE SHE IS DOWN TO 70MG AND STATES SHE IS HAVING MANIC EPISODES, SEEING THINGS, TALKING TO PEOPLE THAT ARENT THERE. ETC. PATIENT IS WANTING TO GET BACK ON OLD MEDS TO CONTROL EPISODES

## 2019-11-20 NOTE — TELEPHONE ENCOUNTER
Trying to reach pt after she called in BG readings. She wass 499 this am after having insulin increased 11/18  Left message to call Jenise and speak to educator

## 2019-11-20 NOTE — TELEPHONE ENCOUNTER
Pt called back to say she was in restroom.  She had call MA and left BG readings also.  After being 499 she took 60 units 75/25 and ate oatmeal and then because still high took 50 units about 2 pm. Had her check her Bg while we derrick on phone and Bg reading HI on Accucheck meter. She had already taken additional 50 units. Asked if she were drinking water  She had also taken steglatro and glimepiride.  Dr Cao wants patient to bring all supplies to [place] so the Educator can witness what she is doing and look at supplies. She is supposed to get some new insulin tomorrow. She will get ride from Dad and come after she gets her insulin.  Verbalized understanding   BG readings scanned to Media

## 2019-11-25 NOTE — TELEPHONE ENCOUNTER
Patient states she does not want increase on seroquel but she will talk to you more about everything at appt 11/27

## 2019-11-25 NOTE — TELEPHONE ENCOUNTER
Pt called in BG's from the weekend.  However, only FBG and HS readings.  Pt c/o on accident that she increased her supper insulin to 70 units and did not see an improvement in her HS and/or FBG readings.  Per MD s/o, increased pt's supper Humalog Mix 75/25 to 80 units.  Instructed pt to check her BG's before lunch and supper at least 2 days this week before possible morning insulin adjustment.  Rx sent.

## 2019-11-26 NOTE — TELEPHONE ENCOUNTER
Pt called to report her doctor put her on a Z pack for an injection and asked if it would have an affect on her BG's.  Called pt back and said antibiotics don't raise BG's typically but stress and infection can.  Pt admits to a lot of stress but see her psychiatrist tomorrow. Pt to call in her BG's next week.  Pt verbalized an understanding.

## 2019-11-27 NOTE — PATIENT INSTRUCTIONS
Bipolar 1 Disorder  Bipolar 1 disorder is a mental health disorder in which a person has episodes of emotional highs (glenys), and may also have episodes of emotional lows (depression) in addition to highs. Bipolar 1 disorder is different from other bipolar disorders because it involves extreme manic episodes. These episodes last at least one week or involve symptoms that are so severe that hospitalization is needed to keep the person safe.  What increases the risk?  The cause of this condition is not known. However, certain factors make you more likely to have bipolar disorder, such as:  · Having a family member with the disorder.  · An imbalance of certain chemicals in the brain (neurotransmitters).  · Stress, such as illness, financial problems, or a death.  · Certain conditions that affect the brain or spinal cord (neurologic conditions).  · Brain injury (trauma).  · Having another mental health disorder, such as:  ? Obsessive compulsive disorder.  ? Schizophrenia.  What are the signs or symptoms?  Symptoms of glenys include:  · Very high self-esteem or self-confidence.  · Decreased need for sleep.  · Unusual talkativeness or feeling a need to keep talking. Speech may be very fast. It may seem like you cannot stop talking.  · Racing thoughts or constant talking, with quick shifts between topics that may or may not be related (flight of ideas).  · Decreased ability to focus or concentrate.  · Increased purposeful activity, such as work, studies, or social activity.  · Increased nonproductive activity. This could be pacing, squirming and fidgeting, or finger and toe tapping.  · Impulsive behavior and poor judgment. This may result in high-risk activities, such as having unprotected sex or spending a lot of money.  Symptoms of depression include:  · Feeling sad, hopeless, or helpless.  · Frequent or uncontrollable crying.  · Lack of feeling or caring about anything.  · Sleeping too much.  · Moving more slowly than  usual.  · Not being able to enjoy things you used to enjoy.  · Wanting to be alone all the time.  · Feeling guilty or worthless.  · Lack of energy or motivation.  · Trouble concentrating or remembering.  · Trouble making decisions.  · Increased appetite.  · Thoughts of death, or the desire to harm yourself.  Sometimes, you may have a mixed mood. This means having symptoms of depression and glenys. Stress can make symptoms worse.  How is this diagnosed?  To diagnose bipolar disorder, your health care provider may ask about your:  · Emotional episodes.  · Medical history.  · Alcohol and drug use. This includes prescription medicines. Certain medical conditions and substances can cause symptoms that seem like bipolar disorder (secondary bipolar disorder).  How is this treated?  Bipolar disorder is a long-term (chronic) illness. It is best controlled with ongoing (continuous) treatment rather than treatment only when symptoms occur. Treatment may include:  · Medicine. Medicine can be prescribed by a provider who specializes in treating mental disorders (psychiatrist).  ? Medicines called mood stabilizers are usually prescribed.  ? If symptoms occur even while taking a mood stabilizer, other medicines may be added.  · Psychotherapy. Some forms of talk therapy, such as cognitive-behavioral therapy (CBT), can provide support, education, and guidance.  · Coping methods, such as journaling or relaxation exercises. These may include:  ? Yoga.  ? Meditation.  ? Deep breathing.  · Lifestyle changes, such as:  ? Limiting alcohol and drug use.  ? Exercising regularly.  ? Getting plenty of sleep.  ? Making healthy eating choices.    A combination of medicine, talk therapy, and coping methods is best. A procedure in which electricity is applied to the brain through the scalp (electroconvulsive therapy) may be used in cases of severe glenys when medicine and psychotherapy work too slowly or do not work.  Follow these instructions at  home:  Activity    · Return to your normal activities as told by your health care provider.  · Find activities that you enjoy, and make time to do them.  · Exercise regularly as told by your health care provider.  Lifestyle  · Limit alcohol intake to no more than 1 drink a day for nonpregnant women and 2 drinks a day for men. One drink equals 12 oz of beer, 5 oz of wine, or 1½ oz of hard liquor.  · Follow a set schedule for eating and sleeping.  · Eat a balanced diet that includes fresh fruits and vegetables, whole grains, low-fat dairy, and lean meat.  · Get 7-8 hours of sleep each night.  General instructions  · Take over-the-counter and prescription medicines only as told by your health care provider.  · Think about joining a support group. Your health care provider may be able to recommend a support group.  · Talk with your family and loved ones about your treatment goals and how they can help.  · Keep all follow-up visits as told by your health care provider. This is important.  Where to find more information  For more information about bipolar disorder, visit the following websites:  · National [place] [employer] Mental [employer]ness: www.sahil.org  · [place] National [place] of [employer] Health: www.nimh.nih.gov  Contact a health care provider if:  · Your symptoms get worse.  · You have side effects from your medicine, and they get worse.  · You have trouble sleeping.  · You have trouble doing daily activities.  · You feel unsafe in your surroundings.  · You are dealing with substance abuse.  Get help right away if:  · You have new symptoms.  · You have thoughts about harming yourself.  · You self-harm.  This information is not intended to replace advice given to you by your health care provider. Make sure you discuss any questions you have with your health care provider.  Document Released: 03/26/2002 Document Revised: 08/13/2017 Document Reviewed: 08/17/2017  [employer] Interactive Patient Education © 2019 [employer]

## 2019-11-27 NOTE — PROGRESS NOTES
"Subjective   Sapphire John is a 54 y.o. female who presents today for follow up    Chief Complaint:  \"my manic attacks are pretty rough \"      History of Present Illness:   The pt suffers from severe bipolar d/o, emotional instability,  She is on methadone now ,  The pt remains manic, talking too fast, increased impulsivity , the pt lives with her father now, he stated the pt is very difficult to deal with   The pt does not sleep well, was staying up, hyperactive, she stated she was so manic that she did not even hear anything     She is down on methadone  To 70 mg , BS is unstable   she reported episodes of depression alternating with episodes of increased E, pressured speech  When depressed - not so deep, rated as 6/10, she was trying to stay busy     AH - when manic    people dont like to be around her when she is like that, stated she is \"getting sick of myself \"   The pt claimed to be compliant with meds    stayed up at night baking cookies , making mess       the pt stated the best combination - seroquel and clonazepam   Precipitating and Ameliorating Factors: unemployment   medical problems         PAST PSYCHIATRIC HISTORY      Previous Psychiatric Diagnoses   Axis I: Affective/Bipolar Disorder, Anxiety/Panic Disorder, Addictive Disorder     Past Hospitalizations or Residential Treatment   Locations\Providers: [facility] no SAs      Prior Psychiatric Medications   Comments: seroquel - effective      suboxone - not effective, now on methadone      vraylar - feels \"something was around her neck\" she was like a zombi      abilify , latuda - not effective      lamictal - not effective   Li, Depakote - not effective      risperidone - drooling      SOCIAL HISTORY     Number of marriages: 2  Number of children: 3  Number of grandkids: 2  Current Relationship is: unstable  Family of Origin is: distant  Comments: Marital Status:   Children: 4 children  Occupation: doesnt work        Current " Living Situation   Lives with: father     Education   Level: some college     Employment   Job Status: unemployed     Hobbies and Leisure Activities   Activity Type: quiet activities     Alcohol use   Freq. drinking: nondrinker  Smoking History   Smoking Hx: Current every day smoker  Pack per day: .5     Exercise   Exercise sessions (per wk): 0     Illicit Drug Use   Illicit Drugs used: opioids     FAMILY HISTORY OF MENTAL DISORDERS   fh Mother: Unknown or Undiagnosed Psychiatric Disorder  Comments: adopted   fh Father: Unknown or Undiagnosed Psychiatric Disorder     The following portions of the patient's history were reviewed and updated as appropriate: allergies, current medications, past family history, past medical history, past social history, past surgical history and problem list.          Interval History  No Change, remains manic     Side Effects  Denied       Past Medical History:  Past Medical History:   Diagnosis Date   • Anxiety    • Bipolar disorder (CMS/[facility])    • Depression    • Head injury    • Hyperlipidemia 2014   • Hypertension 2009   • Obstructive sleep apnea treated with BiPAP 01/2019   • Psychiatric illness    • Substance abuse (CMS/[facility])    • Type 2 diabetes mellitus (CMS/[facility])    • Withdrawal symptoms, drug or narcotic (CMS/[facility])        Past Surgical History:  Past Surgical History:   Procedure Laterality Date   • KNEE SURGERY     • TONSILLECTOMY     • TUBAL ABDOMINAL LIGATION         Problem List:  Patient Active Problem List   Diagnosis   • Hep C w/o coma, chronic (CMS/[facility])   • Hyperlipidemia, mixed   • Hypertension   • Seizures (CMS/[facility])   • Substance abuse requiring supervised withdrawal (CMS/[facility])   • Type 2 diabetes mellitus with hyperglycemia (CMS/[facility])   • Vitamin D deficiency   • Bipolar affective disorder, mixed, severe, with psychotic behavior (CMS/[facility])   • Chronic posttraumatic stress disorder       Allergy:   No Known Allergies     Discontinued Medications:  Medications Discontinued  "During This Encounter   Medication Reason   • QUEtiapine (SEROquel) 200 MG tablet Reorder       Current Medications:   Current Outpatient Medications   Medication Sig Dispense Refill   • BD INSULIN SYRINGE ULTRAFINE 31G X 15/64\" 1 ML misc Pt to use with her insulin vial twice a day 60 each 6   • clonazePAM (KLONOPIN) 0.5 MG tablet Take 1 tablet by mouth 2 (Two) Times a Day As Needed for Seizures. 60 tablet 2   • fluticasone (FLONASE) 50 MCG/ACT nasal spray SPRAY 2 SPRAYS EACH NOSTRIL EVERY DAY  2   • gabapentin (NEURONTIN) 100 MG capsule Take 1 capsule by mouth 3 (Three) Times a Day. (Patient taking differently: Take 300 mg by mouth 3 (Three) Times a Day.) 90 capsule 2   • glimepiride (AMARYL) 2 MG tablet Take 2 tablets po with breakfast and 2 tablets po with supper 180 tablet 3   • glucose blood test strip Use as instructed acuu-chek lane plus twice daily 60 each 6   • HUMALOG MIX 75/25 (75-25) 100 UNIT/ML suspension injection Inject 70 units ac breakfast & 80 units ac supper daily 50 mL 6   • Lancets Misc. (ACCU-CHEK SOFTCLIX LANCET DEV) kit Pt to use to check her blood sugars 1 kit 0   • lisinopril-hydrochlorothiazide (PRINZIDE,ZESTORETIC) 20-12.5 MG per tablet Take  by mouth Daily.  2   • methadone 1 mg/ml oral suspension Take 70 mg by mouth.     • omeprazole (priLOSEC) 40 MG capsule TK ONE C PO QD  2   • QUEtiapine (SEROquel) 200 MG tablet 1 tab po QHS 30 tablet 2   • STEGLATRO 5 MG tablet Take  2 tablets once daily 90 tablet 3     No current facility-administered medications for this visit.          Review of Symptoms:    Psychiatric/Behavioral: Negative for agitation, behavioral problems, confusion, decreased concentration, dysphoric mood, hallucinations, self-injury, sleep disturbance and suicidal ideas.    The patient is  Very nervous/anxious and is  hyperactive.        Physical Exam:   There were no vitals taken for this visit.    Mental Status Exam:   Hygiene:   fair  Cooperation:  Cooperative  Eye " Contact:  Good  Psychomotor Behavior:  calmer   Affect:  labile  Mood: anxious  Hopelessness: Denies  Speech:  Pressured  Thought Process:  Goal directed and Linear  Thought Content:  Normal  Suicidal:  None  Homicidal:  None  Hallucinations:  None now, + when manic   Delusion:  None  Memory:  Intact  Orientation:  Person, Place, Time and Situation  Reliability:  good  Insight:  Good  Judgement:  Good  Impulse Control:  Good    MSE reviewed and accepted with changes     PHQ-9 Score:  PHQ-9 Score: 9       Current every day smoker 3-10 mintues spent counseling Not agreeable to stopping    I advised Sapphire of the risks of tobacco use.     Lab Results:   Office Visit on 11/12/2019   Component Date Value Ref Range Status   • Glucose 11/12/2019 281* 70 - 130 mg/dL Final    ate@6am this morning, 6.5 hours ago, 6am this morning   Lab on 10/29/2019   Component Date Value Ref Range Status   • Glucose 10/29/2019 200* 65 - 99 mg/dL Final   • BUN 10/29/2019 16  6 - 20 mg/dL Final   • Creatinine 10/29/2019 1.07* 0.57 - 1.00 mg/dL Final   • Sodium 10/29/2019 132* 136 - 145 mmol/L Final   • Potassium 10/29/2019 4.1  3.5 - 5.2 mmol/L Final   • Chloride 10/29/2019 92* 98 - 107 mmol/L Final   • CO2 10/29/2019 24.1  22.0 - 29.0 mmol/L Final   • Calcium 10/29/2019 9.3  8.6 - 10.5 mg/dL Final   • Total Protein 10/29/2019 8.1  6.0 - 8.5 g/dL Final   • Albumin 10/29/2019 4.50  3.50 - 5.20 g/dL Final   • ALT (SGPT) 10/29/2019 62* 1 - 33 U/L Final   • AST (SGOT) 10/29/2019 59* 1 - 32 U/L Final   • Alkaline Phosphatase 10/29/2019 108  39 - 117 U/L Final   • Total Bilirubin 10/29/2019 0.3  0.2 - 1.2 mg/dL Final   • eGFR Non African Amer 10/29/2019 54* >60 mL/min/1.73 Final   • Globulin 10/29/2019 3.6  gm/dL Final   • A/G Ratio 10/29/2019 1.3  g/dL Final   • BUN/Creatinine Ratio 10/29/2019 15.0  7.0 - 25.0 Final   • Anion Gap 10/29/2019 15.9* 5.0 - 15.0 mmol/L Final   • Hemoglobin A1C 10/29/2019 9.2* 3.5 - 5.6 % Final   • Total Cholesterol  10/29/2019 224* 0 - 200 mg/dL Final   • Triglycerides 10/29/2019 170* 0 - 150 mg/dL Final   • HDL Cholesterol 10/29/2019 40  40 - 60 mg/dL Final   • LDL Cholesterol  10/29/2019 150* 0 - 100 mg/dL Final   • VLDL Cholesterol 10/29/2019 34  5 - 40 mg/dL Final   • LDL/HDL Ratio 10/29/2019 3.75   Final   • Microalbumin/Creatinine Ratio 10/29/2019    Final    Unable to calculate   • Creatinine, Urine 10/29/2019 39.7  mg/dL Final   • Microalbumin, Urine 10/29/2019 <1.2  mg/dL Final   • TSH 10/29/2019 2.180  0.270 - 4.200 uIU/mL Final       Assessment/Plan   Problems Addressed this Visit        Other    Bipolar affective disorder, mixed, severe, with psychotic behavior (CMS/HCC) (Chronic)    Relevant Medications    QUEtiapine (SEROquel) 200 MG tablet      Other Visit Diagnoses     Bipolar 1 disorder, mixed, severe (CMS/HCC)        Relevant Medications    QUEtiapine (SEROquel) 200 MG tablet          Visit Diagnoses:    ICD-10-CM ICD-9-CM   1. Bipolar 1 disorder, mixed, severe (CMS/HCC) F31.63 296.63   2. Bipolar affective disorder, mixed, severe, with psychotic behavior (CMS/HCC) F31.64 296.64       TREATMENT PLAN/GOALS: Continue supportive psychotherapy efforts and medications as indicated. Treatment and medication options discussed during today's visit. Patient ackowledged and verbally consented to continue with current treatment plan and was educated on the importance of compliance with treatment and follow-up appointments.    MEDICATION ISSUES:  Cont seroquel at  200 mg    Cont  neurontin 100mg TID  The pt went down to methadone 70 mg , confirmed by the clinic, they had no concerns about pt taking clonazepam now, combination of seroquel and clonazepam was the most effective     INSPECT reviewed as expected ,   Discussed medication options and treatment plan of prescribed medication as well as the risks, benefits, and side effects including potential falls, possible impaired driving and metabolic adversities among others.  Patient is agreeable to call the office with any worsening of symptoms or onset of side effects. Patient is agreeable to call 911 or go to the nearest ER should he/she begin having SI/HI. No medication side effects or related complaints today.     MEDS ORDERED DURING VISIT:  New Medications Ordered This Visit   Medications   • QUEtiapine (SEROquel) 200 MG tablet     Si tab po QHS     Dispense:  30 tablet     Refill:  2   • clonazePAM (KLONOPIN) 0.5 MG tablet     Sig: Take 1 tablet by mouth 2 (Two) Times a Day As Needed for Seizures.     Dispense:  60 tablet     Refill:  2       Return in about 3 months (around 2020).         This document has been electronically signed by Luly Bernard MD  2019 9:10 AM

## 2019-12-05 NOTE — TELEPHONE ENCOUNTER
Pt called concerned with elevated BG's.  Pt raised her am Humalog Mix 75/25 to 80 units the last couple days and BG's still elevated.  Per MD s/o, increased her insulin to 90 units BID but to NOT raise it for several days.  Pt verbalized an understanding.

## 2019-12-09 NOTE — TELEPHONE ENCOUNTER
Patient called and states her insulin is needing a PA on the new increase of her insulin. I have sent to the plan. Also she is complaining of both hands and arms being swollen and pain in the hands and arms. She states that for the last two weeks it feels like shooting pains and that her arms and hands are going to explode and she doesn't understand why or know what she can do about this. She states that when she lays down, she gets woken up 3-4x a night from the pain . Please advise.

## 2019-12-16 NOTE — TELEPHONE ENCOUNTER
Pharmacy sent over fax letting us know that the accu-chek lane was no longer being made by [employer]fg. They requested the accu-chek guide with lancets and strips be faxed over.

## 2019-12-23 PROBLEM — E66.01 OBESITY, CLASS III, BMI 40-49.9 (MORBID OBESITY) (HCC): Status: ACTIVE | Noted: 2019-01-01

## 2019-12-23 PROBLEM — I10 HYPERTENSION: Chronic | Status: ACTIVE | Noted: 2019-06-05

## 2019-12-23 PROBLEM — K21.9 GERD WITHOUT ESOPHAGITIS: Chronic | Status: ACTIVE | Noted: 2019-01-01

## 2019-12-23 PROBLEM — IMO0001 INSULIN DEPENDENT DIABETES MELLITUS: Status: ACTIVE | Noted: 2019-01-01

## 2019-12-23 PROBLEM — R07.9 CHEST PAIN IN ADULT: Status: ACTIVE | Noted: 2019-01-01

## 2019-12-23 PROBLEM — E55.9 VITAMIN D DEFICIENCY: Chronic | Status: ACTIVE | Noted: 2019-06-05

## 2019-12-24 PROBLEM — R07.9 CHEST PAIN IN ADULT: Status: RESOLVED | Noted: 2019-01-01 | Resolved: 2019-01-01

## 2019-12-24 PROBLEM — R94.39 ABNORMAL NUCLEAR STRESS TEST: Status: ACTIVE | Noted: 2019-01-01

## 2019-12-24 PROBLEM — J44.9 CHRONIC OBSTRUCTIVE PULMONARY DISEASE (HCC): Status: ACTIVE | Noted: 2019-01-01

## 2019-12-24 PROBLEM — I20.0 UNSTABLE ANGINA (HCC): Status: ACTIVE | Noted: 2019-01-01

## 2019-12-24 PROBLEM — E11.65 TYPE 2 DIABETES MELLITUS WITH HYPERGLYCEMIA, WITHOUT LONG-TERM CURRENT USE OF INSULIN (HCC): Chronic | Status: ACTIVE | Noted: 2019-01-01

## 2019-12-24 PROBLEM — R94.39 ABNORMAL NUCLEAR STRESS TEST: Chronic | Status: ACTIVE | Noted: 2019-01-01

## 2019-12-24 PROBLEM — I20.0 UNSTABLE ANGINA (HCC): Chronic | Status: ACTIVE | Noted: 2019-01-01

## 2019-12-24 PROBLEM — I10 ESSENTIAL HYPERTENSION: Chronic | Status: ACTIVE | Noted: 2019-01-01

## 2019-12-24 PROBLEM — E11.65 TYPE 2 DIABETES MELLITUS WITH HYPERGLYCEMIA, WITHOUT LONG-TERM CURRENT USE OF INSULIN (HCC): Status: ACTIVE | Noted: 2019-01-01

## 2019-12-24 PROBLEM — I10 ESSENTIAL HYPERTENSION: Status: ACTIVE | Noted: 2019-01-01

## 2019-12-24 PROBLEM — I20.0 UNSTABLE ANGINA PECTORIS (HCC): Status: ACTIVE | Noted: 2019-01-01

## 2020-01-01 ENCOUNTER — TELEPHONE (OUTPATIENT)
Dept: ENDOCRINOLOGY | Facility: CLINIC | Age: 55
End: 2020-01-01

## 2020-01-01 ENCOUNTER — HOSPITAL ENCOUNTER (OUTPATIENT)
Dept: GENERAL RADIOLOGY | Facility: HOSPITAL | Age: 55
End: 2020-01-01

## 2020-01-01 ENCOUNTER — OFFICE VISIT (OUTPATIENT)
Dept: CARDIOLOGY | Facility: CLINIC | Age: 55
End: 2020-01-01

## 2020-01-01 ENCOUNTER — TELEPHONE (OUTPATIENT)
Dept: CARDIAC REHAB | Facility: HOSPITAL | Age: 55
End: 2020-01-01

## 2020-01-01 ENCOUNTER — TELEPHONE (OUTPATIENT)
Dept: CARDIOLOGY | Facility: CLINIC | Age: 55
End: 2020-01-01

## 2020-01-01 ENCOUNTER — OFFICE VISIT (OUTPATIENT)
Dept: ENDOCRINOLOGY | Facility: CLINIC | Age: 55
End: 2020-01-01

## 2020-01-01 ENCOUNTER — HOSPITAL ENCOUNTER (OUTPATIENT)
Facility: HOSPITAL | Age: 55
Setting detail: HOSPITAL OUTPATIENT SURGERY
Discharge: HOME | End: 2020-01-15
Attending: INTERNAL MEDICINE | Admitting: INTERNAL MEDICINE

## 2020-01-01 ENCOUNTER — READMISSION MANAGEMENT (OUTPATIENT)
Dept: CALL CENTER | Facility: HOSPITAL | Age: 55
End: 2020-01-01

## 2020-01-01 ENCOUNTER — TELEPHONE (OUTPATIENT)
Dept: PSYCHIATRY | Facility: CLINIC | Age: 55
End: 2020-01-01

## 2020-01-01 ENCOUNTER — OFFICE VISIT (OUTPATIENT)
Dept: PSYCHIATRY | Facility: CLINIC | Age: 55
End: 2020-01-01

## 2020-01-01 ENCOUNTER — LAB (OUTPATIENT)
Dept: LAB | Facility: HOSPITAL | Age: 55
End: 2020-01-01

## 2020-01-01 ENCOUNTER — TELEPHONE (OUTPATIENT)
Dept: BARIATRICS/WEIGHT MGMT | Facility: CLINIC | Age: 55
End: 2020-01-01

## 2020-01-01 ENCOUNTER — APPOINTMENT (OUTPATIENT)
Dept: LAB | Facility: HOSPITAL | Age: 55
End: 2020-01-01

## 2020-01-01 ENCOUNTER — HOSPITAL ENCOUNTER (EMERGENCY)
Facility: HOSPITAL | Age: 55
End: 2020-07-02
Attending: EMERGENCY MEDICINE | Admitting: EMERGENCY MEDICINE

## 2020-01-01 VITALS
DIASTOLIC BLOOD PRESSURE: 65 MMHG | OXYGEN SATURATION: 98 % | HEART RATE: 124 BPM | BODY MASS INDEX: 43.35 KG/M2 | HEIGHT: 68 IN | WEIGHT: 286 LBS | SYSTOLIC BLOOD PRESSURE: 105 MMHG

## 2020-01-01 VITALS
RESPIRATION RATE: 19 BRPM | DIASTOLIC BLOOD PRESSURE: 66 MMHG | SYSTOLIC BLOOD PRESSURE: 109 MMHG | HEART RATE: 83 BPM | OXYGEN SATURATION: 94 % | TEMPERATURE: 98.2 F | BODY MASS INDEX: 41.83 KG/M2 | WEIGHT: 276.02 LBS | HEIGHT: 68 IN

## 2020-01-01 VITALS
WEIGHT: 283 LBS | BODY MASS INDEX: 41.92 KG/M2 | OXYGEN SATURATION: 96 % | HEART RATE: 88 BPM | DIASTOLIC BLOOD PRESSURE: 77 MMHG | SYSTOLIC BLOOD PRESSURE: 116 MMHG | HEIGHT: 69 IN

## 2020-01-01 DIAGNOSIS — I20.0 UNSTABLE ANGINA PECTORIS (HCC): ICD-10-CM

## 2020-01-01 DIAGNOSIS — F43.12 CHRONIC POSTTRAUMATIC STRESS DISORDER: Chronic | ICD-10-CM

## 2020-01-01 DIAGNOSIS — F43.10 POST TRAUMATIC STRESS DISORDER (PTSD): ICD-10-CM

## 2020-01-01 DIAGNOSIS — F41.1 GENERALIZED ANXIETY DISORDER: ICD-10-CM

## 2020-01-01 DIAGNOSIS — F31.64 BIPOLAR AFFECTIVE DISORDER, MIXED, SEVERE, WITH PSYCHOTIC BEHAVIOR (HCC): Chronic | ICD-10-CM

## 2020-01-01 DIAGNOSIS — I10 ESSENTIAL HYPERTENSION: Primary | Chronic | ICD-10-CM

## 2020-01-01 DIAGNOSIS — E55.9 VITAMIN D DEFICIENCY: Chronic | ICD-10-CM

## 2020-01-01 DIAGNOSIS — E78.5 DYSLIPIDEMIA: ICD-10-CM

## 2020-01-01 DIAGNOSIS — E11.9 TYPE 2 DIABETES MELLITUS WITHOUT COMPLICATION, WITH LONG-TERM CURRENT USE OF INSULIN (HCC): ICD-10-CM

## 2020-01-01 DIAGNOSIS — I25.10 CAD S/P PERCUTANEOUS CORONARY ANGIOPLASTY: ICD-10-CM

## 2020-01-01 DIAGNOSIS — F31.64 BIPOLAR AFFECTIVE DISORDER, MIXED, SEVERE, WITH PSYCHOTIC BEHAVIOR (HCC): Primary | Chronic | ICD-10-CM

## 2020-01-01 DIAGNOSIS — Z98.61 CAD S/P PERCUTANEOUS CORONARY ANGIOPLASTY: ICD-10-CM

## 2020-01-01 DIAGNOSIS — Z79.4 TYPE 2 DIABETES MELLITUS WITHOUT COMPLICATION, WITH LONG-TERM CURRENT USE OF INSULIN (HCC): ICD-10-CM

## 2020-01-01 DIAGNOSIS — E78.2 HYPERLIPIDEMIA, MIXED: ICD-10-CM

## 2020-01-01 DIAGNOSIS — I10 ESSENTIAL HYPERTENSION: ICD-10-CM

## 2020-01-01 DIAGNOSIS — I46.9 CARDIAC ARREST (HCC): Primary | ICD-10-CM

## 2020-01-01 DIAGNOSIS — F43.10 POST TRAUMATIC STRESS DISORDER (PTSD): Primary | ICD-10-CM

## 2020-01-01 DIAGNOSIS — Z98.61 CAD S/P PERCUTANEOUS CORONARY ANGIOPLASTY: Chronic | ICD-10-CM

## 2020-01-01 DIAGNOSIS — R94.39 ABNORMAL NUCLEAR STRESS TEST: Chronic | ICD-10-CM

## 2020-01-01 DIAGNOSIS — E11.65 TYPE 2 DIABETES MELLITUS WITH HYPERGLYCEMIA, WITHOUT LONG-TERM CURRENT USE OF INSULIN (HCC): Primary | Chronic | ICD-10-CM

## 2020-01-01 DIAGNOSIS — I25.10 CAD S/P PERCUTANEOUS CORONARY ANGIOPLASTY: Chronic | ICD-10-CM

## 2020-01-01 DIAGNOSIS — I20.0 UNSTABLE ANGINA PECTORIS (HCC): Primary | ICD-10-CM

## 2020-01-01 LAB
25(OH)D3 SERPL-MCNC: 22.1 NG/ML (ref 30–100)
ACT BLD: 235 SECONDS (ref 89–137)
ALBUMIN SERPL-MCNC: 4.1 G/DL (ref 3.5–5.2)
ALBUMIN/GLOB SERPL: 1.2 G/DL
ALP SERPL-CCNC: 103 U/L (ref 39–117)
ALT SERPL W P-5'-P-CCNC: 50 U/L (ref 1–33)
ANION GAP SERPL CALCULATED.3IONS-SCNC: 12 MMOL/L (ref 5–15)
ANION GAP SERPL CALCULATED.3IONS-SCNC: 16.7 MMOL/L (ref 5–15)
AST SERPL-CCNC: 47 U/L (ref 1–32)
BILIRUB SERPL-MCNC: 0.3 MG/DL (ref 0.2–1.2)
BUN BLD-MCNC: 19 MG/DL (ref 6–20)
BUN BLD-MCNC: 22 MG/DL (ref 6–20)
BUN/CREAT SERPL: 15.8 (ref 7–25)
BUN/CREAT SERPL: 19.5 (ref 7–25)
CALCIUM SPEC-SCNC: 9.1 MG/DL (ref 8.6–10.5)
CALCIUM SPEC-SCNC: 9.3 MG/DL (ref 8.6–10.5)
CHLORIDE SERPL-SCNC: 95 MMOL/L (ref 98–107)
CHLORIDE SERPL-SCNC: 98 MMOL/L (ref 98–107)
CLOSE TME COLL+ADP + EPINEP PNL BLD: 75 % (ref 86–100)
CO2 SERPL-SCNC: 24.3 MMOL/L (ref 22–29)
CO2 SERPL-SCNC: 26 MMOL/L (ref 22–29)
CREAT BLD-MCNC: 1.13 MG/DL (ref 0.57–1)
CREAT BLD-MCNC: 1.2 MG/DL (ref 0.57–1)
DEPRECATED RDW RBC AUTO: 46.4 FL (ref 37–54)
ERYTHROCYTE [DISTWIDTH] IN BLOOD BY AUTOMATED COUNT: 15.8 % (ref 12.3–15.4)
GFR SERPL CREATININE-BSD FRML MDRD: 47 ML/MIN/1.73
GFR SERPL CREATININE-BSD FRML MDRD: 51 ML/MIN/1.73
GLOBULIN UR ELPH-MCNC: 3.4 GM/DL
GLUCOSE BLD-MCNC: 132 MG/DL (ref 65–99)
GLUCOSE BLD-MCNC: 330 MG/DL (ref 65–99)
GLUCOSE BLDC GLUCOMTR-MCNC: 279 MG/DL (ref 70–105)
GLUCOSE BLDC GLUCOMTR-MCNC: 326 MG/DL (ref 70–130)
HBA1C MFR BLD: 8.8 % (ref 3.5–5.6)
HCT VFR BLD AUTO: 36.4 % (ref 34–46.6)
HGB BLD-MCNC: 11.7 G/DL (ref 12–15.9)
INR PPP: 0.97 (ref 0.9–1.1)
MCH RBC QN AUTO: 26.5 PG (ref 26.6–33)
MCHC RBC AUTO-ENTMCNC: 32.3 G/DL (ref 31.5–35.7)
MCV RBC AUTO: 82.2 FL (ref 79–97)
PLATELET # BLD AUTO: 368 10*3/MM3 (ref 140–450)
PMV BLD AUTO: 7.8 FL (ref 6–12)
POTASSIUM BLD-SCNC: 4.1 MMOL/L (ref 3.5–5.2)
POTASSIUM BLD-SCNC: 4.6 MMOL/L (ref 3.5–5.2)
PROT SERPL-MCNC: 7.5 G/DL (ref 6–8.5)
PROTHROMBIN TIME: 10.2 SECONDS (ref 9.6–11.7)
RBC # BLD AUTO: 4.43 10*6/MM3 (ref 3.77–5.28)
SODIUM BLD-SCNC: 136 MMOL/L (ref 136–145)
SODIUM BLD-SCNC: 136 MMOL/L (ref 136–145)
WBC NRBC COR # BLD: 7.3 10*3/MM3 (ref 3.4–10.8)

## 2020-01-01 PROCEDURE — 0 IOPAMIDOL PER 1 ML: Performed by: INTERNAL MEDICINE

## 2020-01-01 PROCEDURE — 92950 HEART/LUNG RESUSCITATION CPR: CPT

## 2020-01-01 PROCEDURE — 93010 ELECTROCARDIOGRAM REPORT: CPT | Performed by: INTERNAL MEDICINE

## 2020-01-01 PROCEDURE — 99214 OFFICE O/P EST MOD 30 MIN: CPT | Performed by: INTERNAL MEDICINE

## 2020-01-01 PROCEDURE — 36415 COLL VENOUS BLD VENIPUNCTURE: CPT

## 2020-01-01 PROCEDURE — 36415 COLL VENOUS BLD VENIPUNCTURE: CPT | Performed by: INTERNAL MEDICINE

## 2020-01-01 PROCEDURE — 85576 BLOOD PLATELET AGGREGATION: CPT | Performed by: INTERNAL MEDICINE

## 2020-01-01 PROCEDURE — 85027 COMPLETE CBC AUTOMATED: CPT

## 2020-01-01 PROCEDURE — 80053 COMPREHEN METABOLIC PANEL: CPT | Performed by: INTERNAL MEDICINE

## 2020-01-01 PROCEDURE — 99214 OFFICE O/P EST MOD 30 MIN: CPT | Performed by: PSYCHIATRY & NEUROLOGY

## 2020-01-01 PROCEDURE — 93000 ELECTROCARDIOGRAM COMPLETE: CPT | Performed by: INTERNAL MEDICINE

## 2020-01-01 PROCEDURE — 25010000002 ONDANSETRON PER 1 MG: Performed by: INTERNAL MEDICINE

## 2020-01-01 PROCEDURE — 85610 PROTHROMBIN TIME: CPT

## 2020-01-01 PROCEDURE — C1894 INTRO/SHEATH, NON-LASER: HCPCS | Performed by: INTERNAL MEDICINE

## 2020-01-01 PROCEDURE — 80048 BASIC METABOLIC PNL TOTAL CA: CPT

## 2020-01-01 PROCEDURE — 82962 GLUCOSE BLOOD TEST: CPT

## 2020-01-01 PROCEDURE — 82306 VITAMIN D 25 HYDROXY: CPT | Performed by: INTERNAL MEDICINE

## 2020-01-01 PROCEDURE — 99284 EMERGENCY DEPT VISIT MOD MDM: CPT

## 2020-01-01 PROCEDURE — 83036 HEMOGLOBIN GLYCOSYLATED A1C: CPT | Performed by: INTERNAL MEDICINE

## 2020-01-01 PROCEDURE — C1769 GUIDE WIRE: HCPCS | Performed by: INTERNAL MEDICINE

## 2020-01-01 PROCEDURE — 93458 L HRT ARTERY/VENTRICLE ANGIO: CPT | Performed by: INTERNAL MEDICINE

## 2020-01-01 PROCEDURE — 82962 GLUCOSE BLOOD TEST: CPT | Performed by: INTERNAL MEDICINE

## 2020-01-01 RX ORDER — ATROPINE SULFATE 1 MG/ML
0.5 INJECTION, SOLUTION INTRAMUSCULAR; INTRAVENOUS; SUBCUTANEOUS
Status: DISCONTINUED | OUTPATIENT
Start: 2020-01-01 | End: 2020-01-01 | Stop reason: HOSPADM

## 2020-01-01 RX ORDER — GABAPENTIN 400 MG/1
CAPSULE ORAL
Qty: 90 CAPSULE | Refills: 0 | Status: SHIPPED | OUTPATIENT
Start: 2020-01-01 | End: 2020-01-01

## 2020-01-01 RX ORDER — RANOLAZINE 500 MG/1
TABLET, EXTENDED RELEASE ORAL
Qty: 60 TABLET | Refills: 0 | Status: SHIPPED | OUTPATIENT
Start: 2020-01-01 | End: 2020-01-01

## 2020-01-01 RX ORDER — GABAPENTIN 400 MG/1
400 CAPSULE ORAL 4 TIMES DAILY
Qty: 120 CAPSULE | Refills: 1 | Status: SHIPPED | OUTPATIENT
Start: 2020-01-01 | End: 2020-01-01

## 2020-01-01 RX ORDER — GLIMEPIRIDE 2 MG/1
TABLET ORAL
Qty: 60 TABLET | Refills: 2 | Status: SHIPPED | OUTPATIENT
Start: 2020-01-01 | End: 2020-01-01

## 2020-01-01 RX ORDER — IBUPROFEN 800 MG/1
TABLET ORAL
COMMUNITY
Start: 2020-01-01 | End: 2020-01-01

## 2020-01-01 RX ORDER — CLONAZEPAM 0.5 MG/1
0.5 TABLET ORAL ONCE
Status: COMPLETED | OUTPATIENT
Start: 2020-01-01 | End: 2020-01-01

## 2020-01-01 RX ORDER — ONDANSETRON 2 MG/ML
4 INJECTION INTRAMUSCULAR; INTRAVENOUS ONCE
Status: COMPLETED | OUTPATIENT
Start: 2020-01-01 | End: 2020-01-01

## 2020-01-01 RX ORDER — GABAPENTIN 400 MG/1
CAPSULE ORAL
Qty: 90 CAPSULE | Refills: 1 | Status: SHIPPED | OUTPATIENT
Start: 2020-01-01

## 2020-01-01 RX ORDER — QUETIAPINE FUMARATE 200 MG/1
200 TABLET, FILM COATED ORAL
Qty: 30 TABLET | Refills: 1 | Status: SHIPPED | OUTPATIENT
Start: 2020-01-01 | End: 2020-01-01

## 2020-01-01 RX ORDER — ERGOCALCIFEROL 1.25 MG/1
50000 CAPSULE ORAL WEEKLY
Qty: 12 CAPSULE | Refills: 3 | Status: SHIPPED | OUTPATIENT
Start: 2020-01-01

## 2020-01-01 RX ORDER — CLONAZEPAM 1 MG/1
1 TABLET ORAL 2 TIMES DAILY PRN
Qty: 60 TABLET | Refills: 0 | Status: SHIPPED | OUTPATIENT
Start: 2020-01-01 | End: 2020-01-01

## 2020-01-01 RX ORDER — RANOLAZINE 500 MG/1
500 TABLET, EXTENDED RELEASE ORAL 2 TIMES DAILY
Qty: 60 TABLET | Refills: 5 | Status: SHIPPED | OUTPATIENT
Start: 2020-01-01 | End: 2020-01-01

## 2020-01-01 RX ORDER — GABAPENTIN 400 MG/1
CAPSULE ORAL
Qty: 90 CAPSULE | Refills: 0 | Status: SHIPPED | OUTPATIENT
Start: 2020-01-01 | End: 2020-01-01 | Stop reason: SDUPTHER

## 2020-01-01 RX ORDER — CLONAZEPAM 0.5 MG/1
TABLET ORAL
Qty: 60 TABLET | Refills: 0 | Status: SHIPPED | OUTPATIENT
Start: 2020-01-01 | End: 2020-01-01 | Stop reason: SDUPTHER

## 2020-01-01 RX ORDER — NITROGLYCERIN 0.4 MG/1
TABLET SUBLINGUAL
Qty: 25 TABLET | Refills: 0 | Status: SHIPPED | OUTPATIENT
Start: 2020-01-01 | End: 2020-01-01

## 2020-01-01 RX ORDER — SODIUM CHLORIDE 9 MG/ML
250 INJECTION, SOLUTION INTRAVENOUS ONCE AS NEEDED
Status: DISCONTINUED | OUTPATIENT
Start: 2020-01-01 | End: 2020-01-01 | Stop reason: HOSPADM

## 2020-01-01 RX ORDER — NITROGLYCERIN 0.4 MG/1
0.4 TABLET SUBLINGUAL
Qty: 25 TABLET | Refills: 0 | OUTPATIENT
Start: 2020-01-01 | End: 2020-01-01

## 2020-01-01 RX ORDER — LISINOPRIL AND HYDROCHLOROTHIAZIDE 20; 12.5 MG/1; MG/1
TABLET ORAL DAILY
COMMUNITY
Start: 2019-01-01 | End: 2020-01-01

## 2020-01-01 RX ORDER — LIDOCAINE HYDROCHLORIDE 20 MG/ML
INJECTION, SOLUTION INFILTRATION; PERINEURAL AS NEEDED
Status: DISCONTINUED | OUTPATIENT
Start: 2020-01-01 | End: 2020-01-01 | Stop reason: HOSPADM

## 2020-01-01 RX ORDER — RANOLAZINE 500 MG/1
TABLET, EXTENDED RELEASE ORAL
Qty: 180 TABLET | Refills: 3 | Status: SHIPPED | OUTPATIENT
Start: 2020-01-01

## 2020-01-01 RX ORDER — CLONAZEPAM 1 MG/1
1 TABLET ORAL 2 TIMES DAILY PRN
Qty: 60 TABLET | Refills: 1 | Status: SHIPPED | OUTPATIENT
Start: 2020-01-01 | End: 2020-01-01

## 2020-01-01 RX ORDER — GABAPENTIN 400 MG/1
CAPSULE ORAL
COMMUNITY
Start: 2020-01-01 | End: 2020-01-01 | Stop reason: SDUPTHER

## 2020-01-01 RX ORDER — BLOOD SUGAR DIAGNOSTIC
1 STRIP MISCELLANEOUS 3 TIMES DAILY
COMMUNITY
Start: 2020-01-01

## 2020-01-01 RX ORDER — GLIMEPIRIDE 2 MG/1
TABLET ORAL
Qty: 180 TABLET | Refills: 1 | Status: SHIPPED | OUTPATIENT
Start: 2020-01-01

## 2020-01-01 RX ORDER — QUETIAPINE FUMARATE 200 MG/1
TABLET, FILM COATED ORAL
Qty: 30 TABLET | Refills: 1 | Status: SHIPPED | OUTPATIENT
Start: 2020-01-01 | End: 2020-01-01 | Stop reason: SDUPTHER

## 2020-01-01 RX ORDER — SYRINGE AND NEEDLE,INSULIN,1ML 25GX1"
SYRINGE, EMPTY DISPOSABLE MISCELLANEOUS
COMMUNITY
Start: 2020-01-01

## 2020-01-01 RX ORDER — NITROGLYCERIN 0.4 MG/1
TABLET SUBLINGUAL
Qty: 25 TABLET | Refills: 1 | Status: SHIPPED | OUTPATIENT
Start: 2020-01-01

## 2020-01-01 RX ORDER — SODIUM CHLORIDE 9 MG/ML
100 INJECTION, SOLUTION INTRAVENOUS CONTINUOUS
Status: DISCONTINUED | OUTPATIENT
Start: 2020-01-01 | End: 2020-01-01 | Stop reason: HOSPADM

## 2020-01-01 RX ORDER — CLONAZEPAM 1 MG/1
1 TABLET ORAL 2 TIMES DAILY PRN
Qty: 60 TABLET | Refills: 1 | Status: SHIPPED | OUTPATIENT
Start: 2020-01-01

## 2020-01-01 RX ORDER — NEEDLES, SAFETY 22GX1 1/2"
NEEDLE, DISPOSABLE MISCELLANEOUS
COMMUNITY
Start: 2020-01-01

## 2020-01-01 RX ORDER — LANCETS
1 EACH MISCELLANEOUS 3 TIMES DAILY
COMMUNITY
Start: 2020-01-01

## 2020-01-01 RX ORDER — QUETIAPINE FUMARATE 400 MG/1
400 TABLET, FILM COATED ORAL
Qty: 30 TABLET | Refills: 1 | Status: SHIPPED | OUTPATIENT
Start: 2020-01-01 | End: 2020-01-01

## 2020-01-01 RX ORDER — LOPERAMIDE HYDROCHLORIDE 2 MG/1
CAPSULE ORAL
COMMUNITY
Start: 2020-01-01 | End: 2020-01-01

## 2020-01-01 RX ORDER — QUETIAPINE FUMARATE 400 MG/1
TABLET, FILM COATED ORAL
Qty: 30 TABLET | Refills: 2 | Status: SHIPPED | OUTPATIENT
Start: 2020-01-01

## 2020-01-01 RX ADMIN — CLONAZEPAM 0.5 MG: 0.5 TABLET ORAL at 13:18

## 2020-01-01 RX ADMIN — ONDANSETRON 4 MG: 2 INJECTION INTRAMUSCULAR; INTRAVENOUS at 21:54

## 2020-01-01 NOTE — OUTREACH NOTE
Prep Survey      Responses   Facility patient discharged from?  [facility]   Is patient eligible?  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  Other   Does the patient have Home health ordered?  No   Is there a DME ordered?  No   General alerts for this patient  See CM notes   Prep survey completed?  Yes          Zainab Alejandra RN

## 2020-01-02 NOTE — TELEPHONE ENCOUNTER
Pt returned call. She does not have a cardiologist, so was given Dr. Whitley's office number to call for follow-up, as he saw her in the hospital. She is interested in cardiac rehab. [facility] will be closer for her. Faxed information to that office.

## 2020-01-02 NOTE — PAYOR COMM NOTE
"Discharge notice for case# [id number]    ----------------      THANK YOU,  KEVIN HawkN, RN  Utilization Review  [facility]  Phone: [phone]  Fax: [phone]        Beata John (54 y.o. Female)     Date of Birth Social Security Number Address Home Phone MRN    1965  [address] [place] [address] [phone] [id number]    Sikhism Marital Status          None        Admission Date Admission Type Admitting Provider Attending Provider Department, Room/Bed    19 Emergency Nathaniel Martinez MD  [facility] PROGRESS CARE,     Discharge Date Discharge Disposition Discharge Destination        2019 Home or Self Care              Attending Provider:  (none)   Allergies:  No Known Allergies    Isolation:  None   Infection:  None   Code Status:  Prior    Ht:  175.3 cm (69\")   Wt:  132 kg (290 lb 9.1 oz)    Admission Cmt:  None   Principal Problem:  Unstable angina (CMS/HCC) [I20.0] More...                 Active Insurance as of 2019     Primary Coverage     Payor Plan Insurance Group Employer/Plan Group    [employer] [place] [place]     Payor Plan Address Payor Plan Phone Number Payor Plan Fax Number Effective Dates    [address]   2019 - None Entered    [facility] [id number]       Subscriber Name Subscriber Birth Date Member ID       BEATA JOHN 1965 [id number]                 Emergency Contacts      (Rel.) Home Phone Work Phone Mobile Phone    DEBORAH JOHN (Father) [phone] -- --               Discharge Summary      Nathaniel Martinez MD at 19 1054                [facility] Hospital Medicine Services  DISCHARGE SUMMARY        Prepared For PCP:  Lyell, Reggie Duane, MD    Patient Name: Beata John  : 1965  MRN: [id number]      Date of Admission:   2019    Date of Discharge:  2019    Length of stay:  LOS: 8 days     Hospital Course "     Presenting Problem:   Insulin dependent diabetes mellitus (CMS/[facility]) [E11.9, Z79.4]  Chest pain in adult [R07.9]  Chronic obstructive pulmonary disease, unspecified COPD type (CMS/[facility]) [J44.9]  Unstable angina pectoris (CMS/[facility]) [I20.0]      Active Hospital Problems    Diagnosis  POA   • **Unstable angina (CMS/[facility]) [I20.0]  Yes   • Chronic obstructive pulmonary disease (CMS/[facility]) [J44.9]  Yes   • Unstable angina pectoris (CMS/[facility]) [I20.0]  Yes   • GERD without esophagitis [K21.9]  Yes   • Obesity, Class III, BMI 40-49.9 (morbid obesity) (CMS/[facility]) [E66.01]  Yes   • Abnormal nuclear stress test [R94.39]  No   • Essential hypertension [I10]  Yes   • Type 2 diabetes mellitus with hyperglycemia, without long-term current use of insulin (CMS/[facility]) [E11.65]  Yes   • Bipolar affective disorder, mixed, severe, with psychotic behavior (CMS/[facility]) [F31.64]  Yes   • Chronic posttraumatic stress disorder [F43.12]  Yes   • Seizures (CMS/[facility]) [R56.9]  Yes   • Hypertension [I10]  Yes   • Hyperlipidemia, mixed [E78.2]  Yes   • Type 2 diabetes mellitus with hyperglycemia (CMS/[facility]) [E11.65]  Yes   • Vitamin D deficiency [E55.9]  Yes   • Hep C w/o coma, chronic (CMS/[facility]) [B18.2]  Yes   • Substance abuse requiring supervised withdrawal (CMS/[facility]) [F19.10]  Yes      Resolved Hospital Problems    Diagnosis Date Resolved POA   • Chest pain in adult [R07.9] 12/24/2019 Yes       Active Hospital Problems:      Unstable angina-MI ruled out  -Stress test positive and patient status post cardiac cath which showed:Complex proximal and mid LAD disease and left PDA disease with preserved LV function  -Per cardiology :CT surgery was consulted for severe and complex mid LAD and proximal LAD disease.  Discussed with Dr Hernandezfor cardiac cath monday     Hypertension  -controlled, borderline low suspect due to nitro s/l. Hold lisinopril overnight      Hyperlipidemia  -cont home statin     DM Type II  -cont home meds and insulin, add SSI ac/hs     Bipolar  disorder/PTSD  -cont home klonopin (INSPECT verified by Rx) and seroquel     History of drug abuse -reportedly sober 3 years  -on methadone and continued     GERD  -cont ppi     Obesity BMI 41.54  -encourage lifestyle modifications     GÉNESIS   -on cpap at home       Hospital Course:    Ms. John is a 54 y.o. female with PMH of HTN, HLD, DM II, bipolar disorder/PTSD, drug abuse on methadone, and tobacco abuse who presented to [facility] ED 12/23/2019 with complaints of chest pain. The pain is described as a substernal tightness along with a squeezing type of pain on both sides of her chest. She had bilateral arm soreness as well. The pain started while at rest last night. 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 The pain persisted throughout the night and worsened this morning so she took another nitro s/l and came to the ED. She reported associated shortness or breath, dizziness, and nausea. She could not identify any exacerbating or alleviating factors. She denied any previous cardiac history but stated she has used many illegal drugs such as cocaine in the past. She is not aware of her family history because she was adopted.      In the ED EKG showed NSR. Troponin and d-dimer were normal. She was given 325mg of aspirin, ativan, and nitro s/l prn. Dr. Whitley was called to see if she could have stress test today, but the patient will have to have the stress test tomorrow. She was admitted for further treatment.      Date:: 12/24/2019: Patient seen and examined and patient initially was refusing stress test because she wanted her methadone.  After we give her methadone she went for stress test came out to be positive so she went for cardiac cath which came out to be positive for multivessel disease and she will be requiring possible CABG.     Date 12/25/2019: Patient seen and examined and patient seemed to doing fairly well.  Patient was seen by cardiovascular surgery  and patient has been advised stent placement.     12/26/2019: Patient seen and examined and she denies any complaints.  I also spoke to cardiology and patient planning on having cardiac cath and possible stent placement today.     12/27/19-Patient seen and examined c/o constipation.    12/28/19- denies any chest pain, family at bed side, river RN and cardiolgy, for cardiac cath on Monday 12/29/19 patient wanting a regular diet, bg [id number], occ CP  12/30/19 seen in bed nad,no new complaints, underwent cariac cath    12/31 doing better today, will dc home      Recommendation for Outpatient Providers:   Refer to weight loss clinic    Reasons For Change In Medications and Indications for New Medications:    Day of Discharge     Doing better today      Vital Signs:   Temp:  [98.3 °F (36.8 °C)-98.9 °F (37.2 °C)] 98.3 °F (36.8 °C)  Heart Rate:  [81-88] 85  Resp:  [16-18] 18  BP: (104-125)/(66-72) 104/72       Physical Exam   Constitutional: She is oriented to person, place, and time. She appears well-developed and well-nourished. No distress.   HENT:   Head: Normocephalic and atraumatic.   Mouth/Throat: No oropharyngeal exudate.   Eyes: Pupils are equal, round, and reactive to light. Conjunctivae and EOM are normal. Right eye exhibits no discharge. Left eye exhibits no discharge. No scleral icterus.   Neck: Normal range of motion. No JVD present. No tracheal deviation present. No thyromegaly present.   Cardiovascular: Normal rate, regular rhythm and normal heart sounds. Exam reveals no gallop and no friction rub.   No murmur heard.  Pulmonary/Chest: Effort normal and breath sounds normal. No stridor. No respiratory distress. She has no wheezes. She has no rales. She exhibits no tenderness.   Abdominal: Soft. Bowel sounds are normal. She exhibits no distension and no mass. There is no tenderness. There is no rebound and no guarding. No hernia.   Musculoskeletal: Normal range of motion. She exhibits no edema, tenderness or  deformity.   Lymphadenopathy:     She has no cervical adenopathy.   Neurological: She is alert and oriented to person, place, and time. No cranial nerve deficit or sensory deficit. She exhibits normal muscle tone. Coordination normal.   Skin: Skin is warm and dry. No rash noted. She is not diaphoretic. No erythema.   Psychiatric: She has a normal mood and affect. Her behavior is normal.   Nursing note and vitals reviewed.      Pertinent  and/or Most Recent Results     Results from last 7 days   Lab Units 12/31/19  0700 12/30/19  0741 12/30/19  0409 12/29/19  0708 12/28/19  0424 12/26/19  0241   WBC 10*3/mm3 6.20 6.10  --  5.80  --   --    HEMOGLOBIN g/dL 11.2* 11.3*  --  11.1*  --   --    HEMATOCRIT % 33.7* 34.2  --  33.6*  --   --    PLATELETS 10*3/mm3 233 237  --  227  --   --    SODIUM mmol/L 137 137  --  135* 138  --    POTASSIUM mmol/L 4.0 3.8 3.9 4.0 4.3 4.1   CHLORIDE mmol/L 99 100  --  97* 99  --    CO2 mmol/L 26.0 27.0  --  27.0 28.0  --    BUN mg/dL 13 14  --  17 19  --    CREATININE mg/dL 0.86 0.83  --  0.82 0.95  --    GLUCOSE mg/dL 248* 190*  --  315* 252*  --    CALCIUM mg/dL 8.7 8.7  --  8.6 8.8  --      Results from last 7 days   Lab Units 12/30/19  0836 12/29/19  0708 12/24/19  1852   BILIRUBIN mg/dL  --  0.2  --    ALK PHOS U/L  --  96  --    ALT (SGPT) U/L  --  30  --    AST (SGOT) U/L  --  27  --    PROTIME Seconds 9.8 10.0 10.3   INR  0.92 0.95 0.98   APTT seconds 24.8 25.4 25.5     Results from last 7 days   Lab Units 12/26/19  0241   CHOLESTEROL mg/dL 170   TRIGLYCERIDES mg/dL 196*   HDL CHOL mg/dL 34*           Brief Urine Lab Results  (Last result in the past 365 days)      Color   Clarity   Blood   Leuk Est   Nitrite   Protein   CREAT   Urine HCG        12/27/19 1534 Yellow Clear Negative Negative Negative Negative               Microbiology Results Abnormal     None          Xr Chest 1 View    Result Date: 12/23/2019  Impression: No acute cardiopulmonary process. Mild nodularity is present  within the right perihilar region which may related to granulomatous disease. CT follow-up in 3-4 weeks is recommended given smoking history.  Electronically Signed By-Codi Larson On:12/23/2019 11:27 AM This report was finalized on [id number] by  Codi Larson, .      Results for orders placed during the hospital encounter of 12/23/19   Duplex Vein Mapping Lower Extremity - Bilateral CAR    Narrative · The right greater saphenous vein is patent and of adequate size in the   thigh.  · The right greater saphenous vein is patent and of adequate size in the   calf.  · The left greater saphenous vein is patent and of adequate size in the   thigh.  · The left greater saphenous vein is patent and of adequate size in the   calf.          Results for orders placed during the hospital encounter of 12/23/19   Duplex Vein Mapping Lower Extremity - Bilateral CAR    Narrative · The right greater saphenous vein is patent and of adequate size in the   thigh.  · The right greater saphenous vein is patent and of adequate size in the   calf.  · The left greater saphenous vein is patent and of adequate size in the   thigh.  · The left greater saphenous vein is patent and of adequate size in the   calf.          Results for orders placed during the hospital encounter of 12/23/19   Adult Transthoracic Echo Complete W/ Cont if Necessary Per Protocol    Narrative Normal LV size and contractility EF of 60 to 65%  Normal RV size  Normal atrial size, interatrial septum is not well visualized.  Aortic valve is not well visualized, not all 3 aortic valve leaflets that   are well visualized to exclude bicuspid aortic valve.  Aortic valve   velocities are mildly increased probably due to mild aortic stenosis.  Mitral valve, tricuspid valve appears structurally normal, mild tricuspid   and mitral regurgitation seen.  No pericardial effusion seen.  Proximal aorta appears normal in size.               Test Results Pending at  Discharge        Procedures Performed  Procedure(s):  Percutaneous coronary intervention to LAD  Laser Coronary Atherectomy  Stent DEACON coronary         Consults:   Consults     Date and Time Order Name Status Description    12/23/2019 1401 Hospitalist (on-call MD unless specified) Completed     12/23/2019 1353 Cardiology (on-call MD unless specified) Completed             Discharge Details        Discharge Medications      New Medications      Instructions Start Date   aspirin 325 MG EC tablet   325 mg, Oral, Daily   Start Date:  January 1, 2020     clopidogrel 75 MG tablet  Commonly known as:  PLAVIX   75 mg, Oral, Daily   Start Date:  January 1, 2020     lubiprostone 24 MCG capsule  Commonly known as:  AMITIZA   24 mcg, Oral, 2 Times Daily With Meals      metoprolol tartrate 25 MG tablet  Commonly known as:  LOPRESSOR   12.5 mg, Oral, Every 12 Hours Scheduled      nitroglycerin 0.4 MG SL tablet  Commonly known as:  NITROSTAT   0.4 mg, Sublingual, Every 5 Minutes PRN, Take no more than 3 doses in 15 minutes.         Changes to Medications      Instructions Start Date   clonazePAM 0.5 MG tablet  Commonly known as:  KLONOPIN  What changed:  when to take this   0.5 mg, Oral, 2 Times Daily PRN         Continue These Medications      Instructions Start Date   atorvastatin 10 MG tablet  Commonly known as:  LIPITOR   10 mg, Oral, Nightly      docusate sodium 100 MG capsule  Commonly known as:  COLACE   100 mg, Oral, 2 Times Daily PRN      fluticasone 50 MCG/ACT nasal spray  Commonly known as:  FLONASE   2 sprays, Nasal, Daily      gabapentin 300 MG capsule  Commonly known as:  NEURONTIN   300 mg, Oral, 3 Times Daily      glimepiride 2 MG tablet  Commonly known as:  AMARYL   2 mg, Oral, 2 Times Daily      insulin lispro protamine-insulin lispro (75-25) 100 UNIT/ML suspension injection  Commonly known as:  humaLOG 75-25   90 Units, Subcutaneous, 2 Times Daily With Meals      lisinopril 30 MG tablet  Commonly known as:   PRINIVIL,ZESTRIL   30 mg, Oral, Daily      methadone 1 mg/ml oral suspension   70 mg, Oral, Daily, Verified 70mg oral solution Dosage at 0450 12/24/19 with [facility] [phone]      omeprazole 20 MG capsule  Commonly known as:  priLOSEC   20 mg, Oral, Daily      SEROQUEL 200 MG tablet  Generic drug:  QUEtiapine   200 mg, Oral, Nightly      STEGLATRO 5 MG tablet  Generic drug:  Ertugliflozin L-PyroglutamicAc   5 mg, Oral, 2 Times Daily             No Known Allergies      Discharge Disposition:  Home or Self Care    Diet:  Hospital:  No active diet order        Discharge Activity:   Activity Instructions     Activity as Tolerated              CODE STATUS:    Code Status and Medical Interventions:   Ordered at: 12/23/19 6502     Level Of Support Discussed With:    Patient     Code Status:    CPR     Medical Interventions (Level of Support Prior to Arrest):    Full         Follow-up Appointments  Future Appointments   Date Time Provider Department Center   3/5/2020  9:45 AM Luly Bernard MD [facility] None   5/5/2020 10:00 AM LAB  [facility]  [facility] None   5/12/2020 12:45 PM Aris Cao MD [facility] None       Additional Instructions for the Follow-ups that You Need to Schedule     Call MD With Problems / Concerns   As directed      Instructions: Call [phone] or email [email or web address] for problems or concerns.    Order Comments:  Instructions: Call [phone] or email [email or web address] for problems or concerns.          Discharge Follow-up with PCP   As directed       Currently Documented PCP:    Lyell, Reggie Duane, MD    PCP Phone Number:    [phone]     Follow Up Details:  Follow up with PCP within one week                 Condition on Discharge:      Stable          Electronically signed by Nathaniel Martinez MD, 12/31/19, 10:54 AM.    Time: I spent  35 minutes on this discharge activity which included face-to-face encounter with the  patient/reviewing the data in the system/coordination of the care with the nursing staff as well as consultants/documentation/entering orders.      Electronically signed by Nathaniel Martinez MD at 19 8489     Stacy Michaels APRN at 19 1355                [facility] Medicine Services  DISCHARGE SUMMARY        Prepared For PCP:  Lyell, Reggie Duane, MD    Patient Name: Sapphire John  : 1965  MRN: [id number]      Date of Admission:   2019    Date of Discharge:  2019    Length of stay:  LOS: 8 days     Hospital Course     Presenting Problem:   Insulin dependent diabetes mellitus (CMS/HCC) [E11.9, Z79.4]  Chest pain in adult [R07.9]  Chronic obstructive pulmonary disease, unspecified COPD type (CMS/HCC) [J44.9]  Unstable angina pectoris (CMS/HCC) [I20.0]      Active Hospital Problems    Diagnosis  POA   • **Unstable angina (CMS/HCC) [I20.0]  Yes   • Hypertension [I10]  Yes     Priority: Medium   • Hyperlipidemia, mixed [E78.2]  Yes     Priority: Medium   • Type 2 diabetes mellitus with hyperglycemia (CMS/HCC) [E11.65]  Yes     Priority: Medium   • Chronic obstructive pulmonary disease (CMS/HCC) [J44.9]  Yes   • Unstable angina pectoris (CMS/HCC) [I20.0]  Yes   • GERD without esophagitis [K21.9]  Yes   • Obesity, Class III, BMI 40-49.9 (morbid obesity) (CMS/HCC) [E66.01]  Yes   • Abnormal nuclear stress test [R94.39]  No   • Essential hypertension [I10]  Yes   • Type 2 diabetes mellitus with hyperglycemia, without long-term current use of insulin (CMS/HCC) [E11.65]  Yes   • Bipolar affective disorder, mixed, severe, with psychotic behavior (CMS/HCC) [F31.64]  Yes   • Chronic posttraumatic stress disorder [F43.12]  Yes   • Seizures (CMS/HCC) [R56.9]  Yes   • Vitamin D deficiency [E55.9]  Yes   • Hep C w/o coma, chronic (CMS/HCC) [B18.2]  Yes   • Substance abuse requiring supervised withdrawal (CMS/HCC) [F19.10]  Yes      Resolved Hospital Problems    Diagnosis  Date Resolved POA   • Chest pain in adult [R07.9] 12/24/2019 Yes     Priority: High       Unstable angina-MI ruled out  -Stress test positive and patient status post cardiac cath which showed:Complex proximal and mid LAD disease and left PDA disease with preserved LV function  -Per cardiology: CT surgery was consulted for severe and complex mid LAD and proximal LAD disease. Not a surgical candidate  -s/p high risk PCI 12/30/2019: successful laser atherectomy, PTCA, and drug eluting stent to proximal LAD by Dr. Whitley.   -Discharged on dual antiplatelet therapy with aspirin and plavix as well as beta-blocker, ACE, and statin.  -Follow-up with cardiology in one month     Hypertension  -controlled, borderline low suspect due to nitro s/l.      Hyperlipidemia  -cont home statin     DM Type II  -cont home meds and insulin, add SSI ac/hs     Bipolar disorder/PTSD  -cont home klonopin (INSPECT verified by Rx) and seroquel     History of drug abuse -reportedly sober 3 years  -on methadone and continued  -NO other narcotics      GERD  -cont ppi     Obesity BMI 41.54  -encourage lifestyle modifications     GÉNESIS   -on cpap at home     VTE Prophylaxis - SCDs.      Hospital Course:    Ms. John is a 54 y.o. female with PMH of HTN, HLD, DM II, bipolar disorder/PTSD, drug abuse on methadone, and tobacco abuse who presented to [facility] ED 12/23/2019 with complaints of chest pain. The pain is described as a substernal tightness along with a squeezing type of pain on both sides of her chest. She had bilateral arm soreness as well. The pain started while at rest last night. 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 The pain persisted throughout the night and worsened this morning so she took another nitro s/l and came to the ED. She reported associated shortness or breath, dizziness, and nausea. She could not identify any exacerbating or alleviating factors. She denied any previous  cardiac history but stated she has used many illegal drugs such as cocaine in the past. She is not aware of her family history because she was adopted.      In the ED EKG showed NSR. Troponin and d-dimer were normal. She was given 325mg of aspirin, ativan, and nitro s/l prn. Dr. Whitley was consulted. Patient was admitted for stress test.     The patient had a positive stress test and had cardiac cath, which was positive multivessel disease and CT surgery was consulted for possible CABG. She was determined not to be a surgical candidate and high risk PCI was recommended. 12/30/2019 patient had successful laser atherectomy, PTCA, and drug eluting stent to proximal LAD by Dr. Whitley. She has done well in the last 24 hours and is felt stable for discharge by cardiology. She is discharged on dual antiplatelet therapy with aspirin and plavix as well as beta-blocker, ACE, and statin. She will need to follow up with PCP in one week.     Recommendation for Outpatient Providers:     Dr. Whitley in one week      Day of Discharge       Vital Signs:   Temp:  [98.3 °F (36.8 °C)-98.9 °F (37.2 °C)] 98.3 °F (36.8 °C)  Heart Rate:  [81-88] 85  Resp:  [16-20] 18  BP: (104-125)/(65-86) 104/72     Physical Exam:  Physical Exam   Constitutional: She is oriented to person, place, and time. She appears well-developed and well-nourished. No distress.   HENT:   Head: Normocephalic and atraumatic.   Nose: Nose normal.   Eyes: Pupils are equal, round, and reactive to light. Conjunctivae and EOM are normal.   Neck: Normal range of motion.   Cardiovascular: Normal rate, regular rhythm, normal heart sounds and intact distal pulses.   Pulmonary/Chest: Effort normal and breath sounds normal. No respiratory distress.   Abdominal: Soft. Bowel sounds are normal. She exhibits no distension. There is no tenderness.   Musculoskeletal: Normal range of motion. She exhibits no edema, tenderness or deformity.   Neurological: She is alert and oriented to  person, place, and time. No cranial nerve deficit.   Skin: Skin is warm and dry. No rash noted. She is not diaphoretic. No erythema.   Right groin cath site clean, intact dressing with scant drainage, soft, no hematoma   Psychiatric: She has a normal mood and affect. Her behavior is normal.   Nursing note and vitals reviewed.      Pertinent  and/or Most Recent Results     Results from last 7 days   Lab Units 12/31/19  0700 12/30/19  0741 12/30/19  0409 12/29/19  0708 12/28/19  0424 12/26/19  0241   WBC 10*3/mm3 6.20 6.10  --  5.80  --   --    HEMOGLOBIN g/dL 11.2* 11.3*  --  11.1*  --   --    HEMATOCRIT % 33.7* 34.2  --  33.6*  --   --    PLATELETS 10*3/mm3 233 237  --  227  --   --    SODIUM mmol/L 137 137  --  135* 138  --    POTASSIUM mmol/L 4.0 3.8 3.9 4.0 4.3 4.1   CHLORIDE mmol/L 99 100  --  97* 99  --    CO2 mmol/L 26.0 27.0  --  27.0 28.0  --    BUN mg/dL 13 14  --  17 19  --    CREATININE mg/dL 0.86 0.83  --  0.82 0.95  --    GLUCOSE mg/dL 248* 190*  --  315* 252*  --    CALCIUM mg/dL 8.7 8.7  --  8.6 8.8  --      Results from last 7 days   Lab Units 12/30/19  0836 12/29/19  0708 12/24/19  1852   BILIRUBIN mg/dL  --  0.2  --    ALK PHOS U/L  --  96  --    ALT (SGPT) U/L  --  30  --    AST (SGOT) U/L  --  27  --    PROTIME Seconds 9.8 10.0 10.3   INR  0.92 0.95 0.98   APTT seconds 24.8 25.4 25.5     Results from last 7 days   Lab Units 12/26/19  0241   CHOLESTEROL mg/dL 170   TRIGLYCERIDES mg/dL 196*   HDL CHOL mg/dL 34*           Brief Urine Lab Results  (Last result in the past 365 days)      Color   Clarity   Blood   Leuk Est   Nitrite   Protein   CREAT   Urine HCG        12/27/19 1534 Yellow Clear Negative Negative Negative Negative               Xr Chest 1 View    Result Date: 12/23/2019  Impression: No acute cardiopulmonary process. Mild nodularity is present within the right perihilar region which may related to granulomatous disease. CT follow-up in 3-4 weeks is recommended given smoking history.   Electronically Signed By-Codi Larson On:12/23/2019 11:27 AM This report was finalized on [id number] by  Codi Larson, .      Results for orders placed during the hospital encounter of 12/23/19   Duplex Vein Mapping Lower Extremity - Bilateral CAR    Narrative · The right greater saphenous vein is patent and of adequate size in the   thigh.  · The right greater saphenous vein is patent and of adequate size in the   calf.  · The left greater saphenous vein is patent and of adequate size in the   thigh.  · The left greater saphenous vein is patent and of adequate size in the   calf.          Results for orders placed during the hospital encounter of 12/23/19   Duplex Vein Mapping Lower Extremity - Bilateral CAR    Narrative · The right greater saphenous vein is patent and of adequate size in the   thigh.  · The right greater saphenous vein is patent and of adequate size in the   calf.  · The left greater saphenous vein is patent and of adequate size in the   thigh.  · The left greater saphenous vein is patent and of adequate size in the   calf.          Results for orders placed during the hospital encounter of 12/23/19   Adult Transthoracic Echo Complete W/ Cont if Necessary Per Protocol    Narrative Normal LV size and contractility EF of 60 to 65%  Normal RV size  Normal atrial size, interatrial septum is not well visualized.  Aortic valve is not well visualized, not all 3 aortic valve leaflets that   are well visualized to exclude bicuspid aortic valve.  Aortic valve   velocities are mildly increased probably due to mild aortic stenosis.  Mitral valve, tricuspid valve appears structurally normal, mild tricuspid   and mitral regurgitation seen.  No pericardial effusion seen.  Proximal aorta appears normal in size.               Test Results Pending at Discharge        Procedures Performed  Procedure(s):  Percutaneous coronary intervention to LAD  Laser Coronary Atherectomy  Stent DEACON coronary         Consults:    Consults     Date and Time Order Name Status Description    12/27/2019 0900 Inpatient Anesthesiology Consult      12/24/2019 1703 Inpatient Cardiothoracic Surgery Consult      12/23/2019 1401 Hospitalist (on-call MD unless specified) Completed     12/23/2019 1353 Cardiology (on-call MD unless specified) Completed             Discharge Details        Discharge Medications      New Medications      Instructions Start Date   aspirin 325 MG EC tablet   325 mg, Oral, Daily   Start Date:  January 1, 2020     clopidogrel 75 MG tablet  Commonly known as:  PLAVIX   75 mg, Oral, Daily   Start Date:  January 1, 2020     lubiprostone 24 MCG capsule  Commonly known as:  AMITIZA   24 mcg, Oral, 2 Times Daily With Meals      metoprolol tartrate 25 MG tablet  Commonly known as:  LOPRESSOR   12.5 mg, Oral, Every 12 Hours Scheduled      nitroglycerin 0.4 MG SL tablet  Commonly known as:  NITROSTAT   0.4 mg, Sublingual, Every 5 Minutes PRN, Take no more than 3 doses in 15 minutes.         Changes to Medications      Instructions Start Date   clonazePAM 0.5 MG tablet  Commonly known as:  KLONOPIN  What changed:  when to take this   0.5 mg, Oral, 2 Times Daily PRN         Continue These Medications      Instructions Start Date   atorvastatin 10 MG tablet  Commonly known as:  LIPITOR   10 mg, Oral, Nightly      docusate sodium 100 MG capsule  Commonly known as:  COLACE   100 mg, Oral, 2 Times Daily PRN      fluticasone 50 MCG/ACT nasal spray  Commonly known as:  FLONASE   2 sprays, Nasal, Daily      gabapentin 300 MG capsule  Commonly known as:  NEURONTIN   300 mg, Oral, 3 Times Daily      glimepiride 2 MG tablet  Commonly known as:  AMARYL   2 mg, Oral, 2 Times Daily      insulin lispro protamine-insulin lispro (75-25) 100 UNIT/ML suspension injection  Commonly known as:  humaLOG 75-25   90 Units, Subcutaneous, 2 Times Daily With Meals      lisinopril 30 MG tablet  Commonly known as:  PRINIVIL,ZESTRIL   30 mg, Oral, Daily       methadone 1 mg/ml oral suspension   70 mg, Oral, Daily, Verified 70mg oral solution Dosage at 0450 12/24/19 with [facility] [phone]      omeprazole 20 MG capsule  Commonly known as:  priLOSEC   20 mg, Oral, Daily      SEROQUEL 200 MG tablet  Generic drug:  QUEtiapine   200 mg, Oral, Nightly      STEGLATRO 5 MG tablet  Generic drug:  Ertugliflozin L-PyroglutamicAc   5 mg, Oral, 2 Times Daily             No Known Allergies      Discharge Disposition:  Home or Self Care    Diet:  Hospital:  Diet Order   Procedures   • Diet Cardiac, Diabetic/Consistent Carbs; Healthy Heart; Diabetic - Consistent Carb         Discharge Activity:   Activity Instructions     Per cardiology post heart cath instructions/restrictions            CODE STATUS:    Code Status and Medical Interventions:   Ordered at: 12/23/19 8095     Level Of Support Discussed With:    Patient     Code Status:    CPR     Medical Interventions (Level of Support Prior to Arrest):    Full         Follow-up Appointments  Future Appointments   Date Time Provider Department Center   3/5/2020  9:45 AM Luly Bernard MD [facility] None   5/5/2020 10:00 AM LAB  [facility]  [facility] None   5/12/2020 12:45 PM Aris Cao MD [facility] None       Additional Instructions for the Follow-ups that You Need to Schedule     Call MD With Problems / Concerns   As directed      Instructions: Call [phone] or email [email or web address] for problems or concerns.    Order Comments:  Instructions: Call [phone] or email [email or web address] for problems or concerns.          Discharge Follow-up with PCP   As directed       Currently Documented PCP:    Lyell, Reggie Duane, MD    PCP Phone Number:    [phone]     Follow Up Details:  Follow up with PCP within one week                 Condition on Discharge:      Stable          Electronically signed by MINAL Matson, 12/31/19, 1:55 PM.    Time: I spent  25  minutes on this discharge activity which included face-to-face encounter with the patient/reviewing the data in the system/coordination of the care with the nursing staff as well as consultants/documentation/entering orders.      Electronically signed by Nathaniel Martinez MD at 12/31/19 6289

## 2020-01-02 NOTE — TELEPHONE ENCOUNTER
Sapphire left a VM stating that she saw Dr. Whitley for a procedure. She liked Dr. Whitley and would like to see him as a patient. She is unsure if we take her insurance. Please contact the patient.

## 2020-01-02 NOTE — TELEPHONE ENCOUNTER
Pt called today complaining that no one has called her back. She just wanted to know if her new cardiac stents were going to affect her Diabetes in any way. She had them placed over the christmas holiday.She stated she has left  for Rosaline and several others today.Please note I have answered all incoming calls fro Dr Cao and I have not had a call from her at all.  She just thought someone here should know. Please advise

## 2020-01-03 NOTE — OUTREACH NOTE
Medical Week 1 Survey      Responses   Facility patient discharged from?  [facility]   Does the patient have one of the following disease processes/diagnoses(primary or secondary)?  Other   Is there a successful TCM telephone encounter documented?  No   Week 1 attempt successful?  No   Unsuccessful attempts  Attempt 4 [No answer/Left voicemail]          Martha Cintron LPN

## 2020-01-03 NOTE — TELEPHONE ENCOUNTER
Contacting pt for follow up due to hospitalization and potential enrollment in Cardiac Rehab Phase 2.  Left Message.

## 2020-01-03 NOTE — TELEPHONE ENCOUNTER
Pt called with a FBG of [phone] on Thursday.  Called pt back and LVM to call back when she has about one week of BG's for possible insulin adjustments and that her BG's may be lower with improved circulation from stents being placed.

## 2020-01-06 PROBLEM — Z79.4 TYPE 2 DIABETES MELLITUS WITHOUT COMPLICATION, WITH LONG-TERM CURRENT USE OF INSULIN (HCC): Status: ACTIVE | Noted: 2020-01-01

## 2020-01-06 PROBLEM — Z98.61 CAD S/P PERCUTANEOUS CORONARY ANGIOPLASTY: Status: ACTIVE | Noted: 2020-01-01

## 2020-01-06 PROBLEM — E11.9 TYPE 2 DIABETES MELLITUS WITHOUT COMPLICATION, WITH LONG-TERM CURRENT USE OF INSULIN (HCC): Status: ACTIVE | Noted: 2020-01-01

## 2020-01-06 PROBLEM — E78.5 DYSLIPIDEMIA: Status: ACTIVE | Noted: 2020-01-01

## 2020-01-06 PROBLEM — I25.10 CAD S/P PERCUTANEOUS CORONARY ANGIOPLASTY: Status: ACTIVE | Noted: 2020-01-01

## 2020-01-06 NOTE — PROGRESS NOTES
Cardiology Consult Note    Patient Identification:  Name: Sapphire John  Age: 54 y.o.  Sex: female  :  1965  MRN: [id number]                Chief Complaint : patient co-management chest pain, CAD recent PCI    History of Present Illness:      This is a 54 year old morbidly obese white female with past medical history of      -CAD, cardiac cath 2019 revealed severe complex proximal and mid LAD disease  Status post laser atherectomy and drug-eluting stent to proximal and mid LAD 2019  - hypertension  - dyslipidemia  - Diabetes  - prior substance abuse, bipolar disorder  - current tobacco abuse/COPD  - GÉNESIS mostly non compliant with CPAP  - Bipolar disorder/PTSD  - HEP C  - patient is adopted, unknown family history      Here for hospital follow-up.  Patient is complaining of severe, substernal, chest pain, occurring at rest, with bilateral shoulder and arm pain and edema and is not feeling good since discharge in the hospital.  Patient denies any fever chills.  Has nausea.     Assessment:       Chest pain, recurrent prolonged consistent with unstable angina  CAD cardiac cath 2019 revealed severe complex proximal and mid LAD disease  Status post laser atherectomy and drug-eluting stent to proximal and mid LAD 2019  Heart murmur  Hypertension, dyslipidemia, diabetes  COPD/current smoker/GÉNESIS  Bipolar disorder            Recommendations / Plan:   Patient recently had a complex high risk PCI to proximal LAD, is complaining of recurrent prolonged chest pain on maximal medical management.  Reviewed importance of compliance  Reviewed EKG results with patient  Schedule for cardiac cath risk benefits alternatives explained  EKG NSR with no ST-T changes  Diabetes control   Discussed smoking cessation   Continue ASA, clopidogrel, statin, ACE-I and beta-blockers as tolerated  Counseled on importance of compliance              Diagnosis Plan   1. Unstable angina pectoris (CMS/[facility])  Case Request  Cath Lab: Left Heart Cath    CBC (No Diff)    Basic Metabolic Panel    Protime-INR    XR Chest 2 View   2. CAD S/P percutaneous coronary angioplasty  Case Request Cath Lab: Left Heart Cath    CBC (No Diff)    Basic Metabolic Panel    Protime-INR    XR Chest 2 View   3. Dyslipidemia  Case Request Cath Lab: Left Heart Cath    CBC (No Diff)    Basic Metabolic Panel    Protime-INR    XR Chest 2 View   4. Type 2 diabetes mellitus without complication, with long-term current use of insulin (CMS/[facility])  Case Request Cath Lab: Left Heart Cath    CBC (No Diff)    Basic Metabolic Panel    Protime-INR    XR Chest 2 View   5. Essential hypertension  Case Request Cath Lab: Left Heart Cath    CBC (No Diff)    Basic Metabolic Panel    Protime-INR    XR Chest 2 View        Past Medical History:  Past Medical History:   Diagnosis Date   • Anxiety    • Bipolar disorder (CMS/[facility])    • Depression    • Head injury    • Hyperlipidemia 2014   • Hypertension 2009   • Obstructive sleep apnea treated with BiPAP 01/2019   • Psychiatric illness    • Substance abuse (CMS/[facility])    • Type 2 diabetes mellitus (CMS/[facility])    • Withdrawal symptoms, drug or narcotic (CMS/[facility])      Past Surgical History:  Past Surgical History:   Procedure Laterality Date   • CARDIAC CATHETERIZATION N/A 12/24/2019    Procedure: Left Heart Cath, possible pci;  Surgeon: Matt Whitley MD;  Location:  [facility];  Service: Cardiovascular   • CARDIAC CATHETERIZATION N/A 12/30/2019    Procedure: Percutaneous coronary intervention to LAD;  Surgeon: Matt Whitley MD;  Location:  [facility];  Service: Cardiovascular   • CARDIAC CATHETERIZATION N/A 12/30/2019    Procedure: Laser Coronary Atherectomy;  Surgeon: Matt Whitley MD;  Location:  [facility];  Service: Cardiovascular   • CARDIAC CATHETERIZATION N/A 12/30/2019    Procedure: Stent DEACON coronary;  Surgeon: Matt Whitley MD;   Location: [facility] INVASIVE LOCATION;  Service: Cardiovascular   • KNEE SURGERY     • TONSILLECTOMY     • TUBAL ABDOMINAL LIGATION        Allergies:  No Known Allergies  Home Meds:  Current Meds:     Current Outpatient Medications:   •  aspirin  MG EC tablet, Take 1 tablet by mouth Daily., Disp: 30 tablet, Rfl: 0  •  atorvastatin (LIPITOR) 10 MG tablet, Take 10 mg by mouth Every Night., Disp: , Rfl:   •  clonazePAM (KLONOPIN) 0.5 MG tablet, Take 1 tablet by mouth 2 (Two) Times a Day As Needed for Seizures. (Patient taking differently: Take 0.5 mg by mouth 2 (Two) Times a Day.), Disp: 60 tablet, Rfl: 2  •  clopidogrel (PLAVIX) 75 MG tablet, Take 1 tablet by mouth Daily., Disp: 30 tablet, Rfl: 3  •  docusate sodium (COLACE) 100 MG capsule, Take 100 mg by mouth 2 (Two) Times a Day As Needed for Constipation., Disp: , Rfl:   •  Ertugliflozin L-PyroglutamicAc (STEGLATRO) 5 MG tablet, Take 5 mg by mouth 2 (Two) Times a Day., Disp: , Rfl:   •  fluticasone (FLONASE) 50 MCG/ACT nasal spray, 2 sprays into the nostril(s) as directed by provider Daily., Disp: , Rfl: 2  •  gabapentin (NEURONTIN) 300 MG capsule, Take 300 mg by mouth 3 (Three) Times a Day., Disp: , Rfl:   •  glimepiride (AMARYL) 2 MG tablet, Take 2 mg by mouth 2 (Two) Times a Day., Disp: , Rfl:   •  insulin lispro protamine-insulin lispro (humaLOG 75-25) (75-25) 100 UNIT/ML suspension injection, Inject 90 Units under the skin into the appropriate area as directed 2 (Two) Times a Day With Meals., Disp: , Rfl:   •  lisinopril (PRINIVIL,ZESTRIL) 30 MG tablet, Take 30 mg by mouth Daily., Disp: , Rfl:   •  lubiprostone (AMITIZA) 24 MCG capsule, Take 1 capsule by mouth 2 (Two) Times a Day With Meals., Disp: 30 capsule, Rfl: 0  •  methadone 1 mg/ml oral suspension, Take 70 mg by mouth Daily. Verified 70mg oral solution Dosage at 0450 12/24/19 with [facility] [phone], Disp: , Rfl:   •  metoprolol tartrate (LOPRESSOR) 25 MG tablet,  "Take 0.5 tablets by mouth Every 12 (Twelve) Hours., Disp: 60 tablet, Rfl: 0  •  nitroglycerin (NITROSTAT) 0.4 MG SL tablet, Place 1 tablet under the tongue Every 5 (Five) Minutes As Needed for Chest Pain for up to 2 doses. Take no more than 3 doses in 15 minutes., Disp: 25 tablet, Rfl: 0  •  omeprazole (priLOSEC) 20 MG capsule, Take 20 mg by mouth Daily., Disp: , Rfl: 2  •  QUEtiapine (SEROQUEL) 200 MG tablet, Take 200 mg by mouth Every Night., Disp: , Rfl:   Social History:   Social History     Tobacco Use   • Smoking status: Current Every Day Smoker     Packs/day: 1.00     Types: Cigarettes   • Smokeless tobacco: Never Used   Substance Use Topics   • Alcohol use: No     Frequency: Never      Family History:  Family History   Adopted: Yes   Family history unknown: Yes        Review of Systems : Review of Systems   Constitution: Positive for malaise/fatigue. Negative for weight gain and weight loss.   HENT: Negative for nosebleeds.    Cardiovascular: Positive for chest pain and leg swelling. Negative for dyspnea on exertion, near-syncope, palpitations and syncope.   Respiratory: Positive for shortness of breath. Negative for cough and hemoptysis.    Endocrine: Negative for cold intolerance, heat intolerance, polydipsia and polyphagia.   Hematologic/Lymphatic: Does not bruise/bleed easily.   Skin: Negative for rash.   Musculoskeletal: Positive for arthritis and back pain.   Gastrointestinal: Negative for diarrhea, hematochezia, melena, nausea and vomiting.   Genitourinary: Negative for dysuria and hematuria.   Neurological: Positive for dizziness, light-headedness and numbness. Negative for seizures.   Psychiatric/Behavioral: Negative for altered mental status.         Constitutional:  Heart Rate:  [88] 88  BP: (116)/(77) 116/77    Physical Exam   /77   Pulse 88   Ht 175.3 cm (69\")   Wt 128 kg (283 lb)   SpO2 96%   BMI 41.79 kg/m²   Physical Exam  General:  Appears in no acute distress  Eyes: Sclera is " anicteric,  conjunctiva is clear   HEENT:  No JVD. Thyroid not visibly enlarged. No mucosal pallor or cyanosis  Respiratory: Respirations regular and unlabored at rest.  Bilaterally good breath sounds, with good air entry in all fields. No crackles, rubs or wheezes auscultated  Cardiovascular: S1,S2 Regular rate and rhythm. No murmur, rub or gallop auscultated. No pretibial pitting edema  Gastrointestinal: Abdomen soft, flat, non tender. Bowel sounds present.   Musculoskeletal:  No abnormal movements  Extremities: No digital clubbing or cyanosis  Skin: Color pink. Skin warm and dry to touch. No rashes  No xanthoma  Neuro: Alert and awake, no lateralizing deficits appreciated    Cardiographics  ECG: EKG tracing was  personally reviewed by me    ECG 12 Lead  Date/Time: 1/6/2020 9:47 PM  Performed by: Matt Whitley MD  Authorized by: Matt Whitley MD   Comparison: compared with previous ECG from 12/31/2019  Comparison to previous ECG: EKG done today reviewed by me shows sinus rhythm at the rate of 89 bpm with sinus arrhythmia and Q waves in V1 V2 Sastry of anteroseptal MI, right atrial enlargement, no new change compared to EKG from 12/31/2019                Echocardiogram:   Results for orders placed during the hospital encounter of 12/23/19   Adult Transthoracic Echo Complete W/ Cont if Necessary Per Protocol    Narrative Normal LV size and contractility EF of 60 to 65%  Normal RV size  Normal atrial size, interatrial septum is not well visualized.  Aortic valve is not well visualized, not all 3 aortic valve leaflets that   are well visualized to exclude bicuspid aortic valve.  Aortic valve   velocities are mildly increased probably due to mild aortic stenosis.  Mitral valve, tricuspid valve appears structurally normal, mild tricuspid   and mitral regurgitation seen.  No pericardial effusion seen.  Proximal aorta appears normal in size.       Imaging  Chest X-ray:   Imaging Results (Last 24  Hours)     ** No results found for the last 24 hours. **          Lab Review: I have reviewed the labs          Results from last 7 days   Lab Units 12/31/19  0700   SODIUM mmol/L 137   POTASSIUM mmol/L 4.0   BUN mg/dL 13   CREATININE mg/dL 0.86   CALCIUM mg/dL 8.7     @[id number]@  Results from last 7 days   Lab Units 12/31/19  0700   WBC 10*3/mm3 6.20   HEMOGLOBIN g/dL 11.2*   HEMATOCRIT % 33.7*   PLATELETS 10*3/mm3 233               Matt Whitley MD  1/6/2020, 9:45 PM      EMR Dragon/Transcription:   Dictated utilizing Dragon dictation

## 2020-01-06 NOTE — H&P (VIEW-ONLY)
Cardiology Consult Note    Patient Identification:  Name: Sapphire John  Age: 54 y.o.  Sex: female  :  1965  MRN: [id number]                Chief Complaint : patient co-management chest pain, CAD recent PCI    History of Present Illness:      This is a 54 year old morbidly obese white female with past medical history of      -CAD, cardiac cath 2019 revealed severe complex proximal and mid LAD disease  Status post laser atherectomy and drug-eluting stent to proximal and mid LAD 2019  - hypertension  - dyslipidemia  - Diabetes  - prior substance abuse, bipolar disorder  - current tobacco abuse/COPD  - GÉNESIS mostly non compliant with CPAP  - Bipolar disorder/PTSD  - HEP C  - patient is adopted, unknown family history      Here for hospital follow-up.  Patient is complaining of severe, substernal, chest pain, occurring at rest, with bilateral shoulder and arm pain and edema and is not feeling good since discharge in the hospital.  Patient denies any fever chills.  Has nausea.     Assessment:       Chest pain, recurrent prolonged consistent with unstable angina  CAD cardiac cath 2019 revealed severe complex proximal and mid LAD disease  Status post laser atherectomy and drug-eluting stent to proximal and mid LAD 2019  Heart murmur  Hypertension, dyslipidemia, diabetes  COPD/current smoker/GÉNESIS  Bipolar disorder            Recommendations / Plan:   Patient recently had a complex high risk PCI to proximal LAD, is complaining of recurrent prolonged chest pain on maximal medical management.  Reviewed importance of compliance  Reviewed EKG results with patient  Schedule for cardiac cath risk benefits alternatives explained  EKG NSR with no ST-T changes  Diabetes control   Discussed smoking cessation   Continue ASA, clopidogrel, statin, ACE-I and beta-blockers as tolerated  Counseled on importance of compliance              Diagnosis Plan   1. Unstable angina pectoris (CMS/[facility])  Case Request  Cath Lab: Left Heart Cath    CBC (No Diff)    Basic Metabolic Panel    Protime-INR    XR Chest 2 View   2. CAD S/P percutaneous coronary angioplasty  Case Request Cath Lab: Left Heart Cath    CBC (No Diff)    Basic Metabolic Panel    Protime-INR    XR Chest 2 View   3. Dyslipidemia  Case Request Cath Lab: Left Heart Cath    CBC (No Diff)    Basic Metabolic Panel    Protime-INR    XR Chest 2 View   4. Type 2 diabetes mellitus without complication, with long-term current use of insulin (CMS/[facility])  Case Request Cath Lab: Left Heart Cath    CBC (No Diff)    Basic Metabolic Panel    Protime-INR    XR Chest 2 View   5. Essential hypertension  Case Request Cath Lab: Left Heart Cath    CBC (No Diff)    Basic Metabolic Panel    Protime-INR    XR Chest 2 View        Past Medical History:  Past Medical History:   Diagnosis Date   • Anxiety    • Bipolar disorder (CMS/[facility])    • Depression    • Head injury    • Hyperlipidemia 2014   • Hypertension 2009   • Obstructive sleep apnea treated with BiPAP 01/2019   • Psychiatric illness    • Substance abuse (CMS/[facility])    • Type 2 diabetes mellitus (CMS/[facility])    • Withdrawal symptoms, drug or narcotic (CMS/[facility])      Past Surgical History:  Past Surgical History:   Procedure Laterality Date   • CARDIAC CATHETERIZATION N/A 12/24/2019    Procedure: Left Heart Cath, possible pci;  Surgeon: Matt Whitley MD;  Location:  [facility];  Service: Cardiovascular   • CARDIAC CATHETERIZATION N/A 12/30/2019    Procedure: Percutaneous coronary intervention to LAD;  Surgeon: Matt Whitley MD;  Location:  [facility];  Service: Cardiovascular   • CARDIAC CATHETERIZATION N/A 12/30/2019    Procedure: Laser Coronary Atherectomy;  Surgeon: Matt Whitley MD;  Location:  [facility];  Service: Cardiovascular   • CARDIAC CATHETERIZATION N/A 12/30/2019    Procedure: Stent DEACON coronary;  Surgeon: Matt Whitley MD;   Location: [facility] INVASIVE LOCATION;  Service: Cardiovascular   • KNEE SURGERY     • TONSILLECTOMY     • TUBAL ABDOMINAL LIGATION        Allergies:  No Known Allergies  Home Meds:  Current Meds:     Current Outpatient Medications:   •  aspirin  MG EC tablet, Take 1 tablet by mouth Daily., Disp: 30 tablet, Rfl: 0  •  atorvastatin (LIPITOR) 10 MG tablet, Take 10 mg by mouth Every Night., Disp: , Rfl:   •  clonazePAM (KLONOPIN) 0.5 MG tablet, Take 1 tablet by mouth 2 (Two) Times a Day As Needed for Seizures. (Patient taking differently: Take 0.5 mg by mouth 2 (Two) Times a Day.), Disp: 60 tablet, Rfl: 2  •  clopidogrel (PLAVIX) 75 MG tablet, Take 1 tablet by mouth Daily., Disp: 30 tablet, Rfl: 3  •  docusate sodium (COLACE) 100 MG capsule, Take 100 mg by mouth 2 (Two) Times a Day As Needed for Constipation., Disp: , Rfl:   •  Ertugliflozin L-PyroglutamicAc (STEGLATRO) 5 MG tablet, Take 5 mg by mouth 2 (Two) Times a Day., Disp: , Rfl:   •  fluticasone (FLONASE) 50 MCG/ACT nasal spray, 2 sprays into the nostril(s) as directed by provider Daily., Disp: , Rfl: 2  •  gabapentin (NEURONTIN) 300 MG capsule, Take 300 mg by mouth 3 (Three) Times a Day., Disp: , Rfl:   •  glimepiride (AMARYL) 2 MG tablet, Take 2 mg by mouth 2 (Two) Times a Day., Disp: , Rfl:   •  insulin lispro protamine-insulin lispro (humaLOG 75-25) (75-25) 100 UNIT/ML suspension injection, Inject 90 Units under the skin into the appropriate area as directed 2 (Two) Times a Day With Meals., Disp: , Rfl:   •  lisinopril (PRINIVIL,ZESTRIL) 30 MG tablet, Take 30 mg by mouth Daily., Disp: , Rfl:   •  lubiprostone (AMITIZA) 24 MCG capsule, Take 1 capsule by mouth 2 (Two) Times a Day With Meals., Disp: 30 capsule, Rfl: 0  •  methadone 1 mg/ml oral suspension, Take 70 mg by mouth Daily. Verified 70mg oral solution Dosage at 0450 12/24/19 with [facility] [phone], Disp: , Rfl:   •  metoprolol tartrate (LOPRESSOR) 25 MG tablet,  "Take 0.5 tablets by mouth Every 12 (Twelve) Hours., Disp: 60 tablet, Rfl: 0  •  nitroglycerin (NITROSTAT) 0.4 MG SL tablet, Place 1 tablet under the tongue Every 5 (Five) Minutes As Needed for Chest Pain for up to 2 doses. Take no more than 3 doses in 15 minutes., Disp: 25 tablet, Rfl: 0  •  omeprazole (priLOSEC) 20 MG capsule, Take 20 mg by mouth Daily., Disp: , Rfl: 2  •  QUEtiapine (SEROQUEL) 200 MG tablet, Take 200 mg by mouth Every Night., Disp: , Rfl:   Social History:   Social History     Tobacco Use   • Smoking status: Current Every Day Smoker     Packs/day: 1.00     Types: Cigarettes   • Smokeless tobacco: Never Used   Substance Use Topics   • Alcohol use: No     Frequency: Never      Family History:  Family History   Adopted: Yes   Family history unknown: Yes        Review of Systems : Review of Systems   Constitution: Positive for malaise/fatigue. Negative for weight gain and weight loss.   HENT: Negative for nosebleeds.    Cardiovascular: Positive for chest pain and leg swelling. Negative for dyspnea on exertion, near-syncope, palpitations and syncope.   Respiratory: Positive for shortness of breath. Negative for cough and hemoptysis.    Endocrine: Negative for cold intolerance, heat intolerance, polydipsia and polyphagia.   Hematologic/Lymphatic: Does not bruise/bleed easily.   Skin: Negative for rash.   Musculoskeletal: Positive for arthritis and back pain.   Gastrointestinal: Negative for diarrhea, hematochezia, melena, nausea and vomiting.   Genitourinary: Negative for dysuria and hematuria.   Neurological: Positive for dizziness, light-headedness and numbness. Negative for seizures.   Psychiatric/Behavioral: Negative for altered mental status.         Constitutional:  Heart Rate:  [88] 88  BP: (116)/(77) 116/77    Physical Exam   /77   Pulse 88   Ht 175.3 cm (69\")   Wt 128 kg (283 lb)   SpO2 96%   BMI 41.79 kg/m²   Physical Exam  General:  Appears in no acute distress  Eyes: Sclera is " anicteric,  conjunctiva is clear   HEENT:  No JVD. Thyroid not visibly enlarged. No mucosal pallor or cyanosis  Respiratory: Respirations regular and unlabored at rest.  Bilaterally good breath sounds, with good air entry in all fields. No crackles, rubs or wheezes auscultated  Cardiovascular: S1,S2 Regular rate and rhythm. No murmur, rub or gallop auscultated. No pretibial pitting edema  Gastrointestinal: Abdomen soft, flat, non tender. Bowel sounds present.   Musculoskeletal:  No abnormal movements  Extremities: No digital clubbing or cyanosis  Skin: Color pink. Skin warm and dry to touch. No rashes  No xanthoma  Neuro: Alert and awake, no lateralizing deficits appreciated    Cardiographics  ECG: EKG tracing was  personally reviewed by me    ECG 12 Lead  Date/Time: 1/6/2020 9:47 PM  Performed by: Matt Whitley MD  Authorized by: Matt Whitley MD   Comparison: compared with previous ECG from 12/31/2019  Comparison to previous ECG: EKG done today reviewed by me shows sinus rhythm at the rate of 89 bpm with sinus arrhythmia and Q waves in V1 V2 Sastry of anteroseptal MI, right atrial enlargement, no new change compared to EKG from 12/31/2019                Echocardiogram:   Results for orders placed during the hospital encounter of 12/23/19   Adult Transthoracic Echo Complete W/ Cont if Necessary Per Protocol    Narrative Normal LV size and contractility EF of 60 to 65%  Normal RV size  Normal atrial size, interatrial septum is not well visualized.  Aortic valve is not well visualized, not all 3 aortic valve leaflets that   are well visualized to exclude bicuspid aortic valve.  Aortic valve   velocities are mildly increased probably due to mild aortic stenosis.  Mitral valve, tricuspid valve appears structurally normal, mild tricuspid   and mitral regurgitation seen.  No pericardial effusion seen.  Proximal aorta appears normal in size.       Imaging  Chest X-ray:   Imaging Results (Last 24  Hours)     ** No results found for the last 24 hours. **          Lab Review: I have reviewed the labs          Results from last 7 days   Lab Units 12/31/19  0700   SODIUM mmol/L 137   POTASSIUM mmol/L 4.0   BUN mg/dL 13   CREATININE mg/dL 0.86   CALCIUM mg/dL 8.7     @[id number]@  Results from last 7 days   Lab Units 12/31/19  0700   WBC 10*3/mm3 6.20   HEMOGLOBIN g/dL 11.2*   HEMATOCRIT % 33.7*   PLATELETS 10*3/mm3 233               Matt Whitley MD  1/6/2020, 9:45 PM      EMR Dragon/Transcription:   Dictated utilizing Dragon dictation

## 2020-01-07 NOTE — TELEPHONE ENCOUNTER
"Patient called with Diarrhea and concern of her cath site (she had to Heart Cath's 12/24/19 12/30/19). Patient concerned her cath site could be infected causing diarrhea for the last week. Patient states there is a knot at the site the size of a \"butter bean\". Patient denies that the site is red, warm, or painful. Advised patient cath site is not likely to cause her diarrhea. Advised patient MD out of the office today. Advised to continue to monitor site and if it increases in size or she develops pain to go to the ER for further testing.Advised patient to go to the Urgent Care/ ER if her Diarrhea persist or worsens. Patient verbalizes understanding.  "

## 2020-01-07 NOTE — TELEPHONE ENCOUNTER
Second call to follow up on recent PCI and Cardiac Rehab referral.  Pt states is trying to sleep and will call us back later.

## 2020-01-10 NOTE — TELEPHONE ENCOUNTER
Pt states that her thumb, pointer, and long finger has been numb for a few hours. She has been moving all day. She was advised to go to the ER, she declined, she was advised to go if symptoms persist or worsen. Pt voiced understanding.

## 2020-01-13 NOTE — TELEPHONE ENCOUNTER
Pt called to report her BG's are averaging from 80 to 140 and has stopped her insulin and only taking Glimepiride and Steglatro due to low BG's and not being able to get through to [facility].  Pt states she could not give us exact BG's over the phone due to moving and too busy.  Pt states her BG's only go up if she drinks regular soda or drinks milkshakes.  Instructed pt to call back if her BG's start to go back up while not drinking regular sodas and milkshakes.

## 2020-01-13 NOTE — OUTREACH NOTE
Medical Week 2 Survey      Responses   Facility patient discharged from?  [facility]   Does the patient have one of the following disease processes/diagnoses(primary or secondary)?  Other   Week 2 attempt successful?  Yes   Call start time  0929   Call end time  0930   Week 2 Call Completed?  Yes   Graduated  Yes   Did the patient feel the follow up calls were helpful during their recovery period?  Yes   Was the number of calls appropriate?  Yes   Graduated/Revoked comments  PATIENT NOT ENGAGED IN PHONE CALL. STATES I AM COMING IN THERE WEDNESDAY FOR A PROCEDURE, THEN ENDED CALL QUICKLY.          Maggy Null LPN

## 2020-01-15 PROBLEM — I20.0 UNSTABLE ANGINA PECTORIS (HCC): Chronic | Status: ACTIVE | Noted: 2019-01-01

## 2020-01-15 PROBLEM — Z98.61 CAD S/P PERCUTANEOUS CORONARY ANGIOPLASTY: Chronic | Status: ACTIVE | Noted: 2020-01-01

## 2020-01-15 PROBLEM — I25.10 CAD S/P PERCUTANEOUS CORONARY ANGIOPLASTY: Chronic | Status: ACTIVE | Noted: 2020-01-01

## 2020-01-15 PROBLEM — Z79.4 TYPE 2 DIABETES MELLITUS WITHOUT COMPLICATION, WITH LONG-TERM CURRENT USE OF INSULIN (HCC): Chronic | Status: ACTIVE | Noted: 2020-01-01

## 2020-01-15 PROBLEM — E78.5 DYSLIPIDEMIA: Chronic | Status: ACTIVE | Noted: 2020-01-01

## 2020-01-15 PROBLEM — E11.9 TYPE 2 DIABETES MELLITUS WITHOUT COMPLICATION, WITH LONG-TERM CURRENT USE OF INSULIN (HCC): Chronic | Status: ACTIVE | Noted: 2020-01-01

## 2020-01-15 NOTE — TELEPHONE ENCOUNTER
Patient called 1/10/20 for separate issue again advised to go to the ER patient declines. Patient has scheduled procedure today with Dr. Angi Rausch

## 2020-01-15 NOTE — DISCHARGE INSTRUCTIONS
Post Cath Instructions    Specific Physician Instructions:    1) Drink plenty of fluids for the next 24 hours.  This helps to eliminate the dye used in your procedure through urination.  You may resume a normal diet; however, try to avoid foods that would cause gas or constipation.    2) Sedative medication given to you during your catheterization may decrease your judgement and reaction time for up to 24-48 hours.  Therefore:  a. DO NOT drive or operate hazardous machinery (48 hours)  b. DO NOT consume alcoholic beverages  c. DO NOT make any important/legal decisions  d. Have someone stay with you for at least 24 hours    3) To allow proper healing and prevent bleeding, the following activities are to be strictly avoided for the next 24-48 hours:  a. Excessive bending at wound site  b. Straining (anything that would tense up muscles around the affected puncture site)  c. Lifting objects greater than 5 pounds, pushing, or pulling for 5 days  i. For Arm Cases:  1. No flexing at the puncture site, such as hammering, golfing, bowling, or swinging any objects  ii. For Groin Cases:  1. Refrain from sexual activity  2. Refrain from running or vigorous walking  3. No prolonged sitting or standing  4. Limit stair climbing as much as possible    4) Keep the puncture site clean and dry.  You may remove the dressing tomorrow and replace it with a band-aid for at least one additional day.  Gently clean the site with mild soap and water.  No scrubbing/rubbing and lightly pat the area dry.  Showers are acceptable; however, avoid submerging in water (tub baths, hot tubs, swimming pools, dishwater, etc…) for at least one week.  The site should be completely healed before resuming these activities to reduce the risk of infection.  Check the site often.  Watch for signs and symptoms of infection and notify your physician if any of the following occur:  a. Bleeding or an increase in swelling at the puncture  site  b. Fever  c. Increased soreness around puncture site  d. Foul odor or significant drainage from the puncture site  e. Swelling, redness, or warmth at the puncture site    **A bruise or small “pea sized” lump under the skin at the puncture site is not unusual.  This should disappear within 3-4 weeks.**  5) CONTACT YOUR PHYSICIAN OR CALL 911 IF YOU EXPERIENCE ANY OF THE FOLLOWING:  a. Increased angina (chest pain) or frequent sensations of pressure, burning, pain, or other discomfort in the chest, arm, jaws, or stomach  b. Lightheadedness, dizziness, faint feeling, sweating, or difficulty breathing  c. Odd sensation changes like numbness, tingling, coldness, or pain in the arm or leg in which the catheter was inserted  d. Limb in which the catheter was inserted becomes pale/bluish in color    IMPORTANT:  Although this occurs very rarely, if you should develop bright red or excessive bleeding, feel a “pop” inside at the insertion site, or notice a sudden increase in swelling larger than a walnut, you should call 911.  Hold continuous firm pressure to the access site until emergency personnel arrive.  It is best if someone else can do this for you.

## 2020-01-17 NOTE — TELEPHONE ENCOUNTER
Pt called in BG and had a high last night   Took insulin and this am was 207 and called for advise  Reached out to pt and for 207 BG she had taken 50 units of 75/25 along with her Glimepiride and Steglatro  She will keep close eye on it and drink soda if needed  Suggested she take 1/2 that amount or better to call for advise than take too much  Bg otherwise have been much better  Verbalized understanding

## 2020-01-31 NOTE — TELEPHONE ENCOUNTER
Called in BG readings  Adjusting dose of insulin as to what she thinks but typically 30 units with lunch and will only take more if BG higher. Took 60 at lunch on weekend when BG were in 300's and then for 477 ysterday she took 90 units.  Advised against taking that much as it wasn't safe.  Verbalized understanding but will keep an eye and adjust as needed

## 2020-02-12 NOTE — TELEPHONE ENCOUNTER
Pt called to report that she has been sick with diarrhea and fever and has not being taking her insulin but continues with her oral diabetes meds.  Pt concerned that after she ate several ice cream sandwiches her BG was 130 with no insulin.  Instructed pt to check her BG's every 4-6 hours, drink plenty of water and rely on soft/liquid foods such as applesauce, yogurt, pudding, apple/grape juice until she feels better and to start back on her insulin once her BG's increase.  Pt verbalized an understanding.

## 2020-02-19 NOTE — TELEPHONE ENCOUNTER
Pt called about a low Bg after she had given insulin for a high BG.  She has been taking her Diabetes pills regularly but she has been taking insulin only when over 250  Recently her Bg have been better controlled and she hasn't been taking insulin and she took 45 units for a 355 BG.  See scanned phone note  Asking about once a week inject vs insulin.

## 2020-02-19 NOTE — TELEPHONE ENCOUNTER
So, she is only taking insulin when the blood sugar is > 250. Well, how often is that? Once a week or once day?  Her appt with me is not until May.  She needs to come in & meet with educator, bring at least 1 week of blood sugars so we can have a better idea of what is going on.  I am not going to switch her off insulin until I get a baseline.  He She can't take 45 units or nothing. It is of course better to cut back the insulin to maybe 15 units bid, before attempting to make a drastic change.  Also, Steglatro can be increased to 15 mg. She can take all 3 pills before breakfast until she finishes her supply.  Then get a Rx for 15 mg tab.  Additionally, the glimepiride can be doubled if needed.  But first we need consistency.Let's get her in & go from there.

## 2020-03-05 PROBLEM — F41.1 GENERALIZED ANXIETY DISORDER: Status: ACTIVE | Noted: 2020-01-01
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
## 2020-03-05 NOTE — PATIENT INSTRUCTIONS
Bipolar 1 Disorder  Bipolar 1 disorder is a mental health disorder in which a person has episodes of emotional highs (glenys), and may also have episodes of emotional lows (depression) in addition to highs. Bipolar 1 disorder is different from other bipolar disorders because it involves extreme manic episodes. These episodes last at least one week or involve symptoms that are so severe that hospitalization is needed to keep the person safe.  What increases the risk?  The cause of this condition is not known. However, certain factors make you more likely to have bipolar disorder, such as:  · Having a family member with the disorder.  · An imbalance of certain chemicals in the brain (neurotransmitters).  · Stress, such as illness, financial problems, or a death.  · Certain conditions that affect the brain or spinal cord (neurologic conditions).  · Brain injury (trauma).  · Having another mental health disorder, such as:  ? Obsessive compulsive disorder.  ? Schizophrenia.  What are the signs or symptoms?  Symptoms of glenys include:  · Very high self-esteem or self-confidence.  · Decreased need for sleep.  · Unusual talkativeness or feeling a need to keep talking. Speech may be very fast. It may seem like you cannot stop talking.  · Racing thoughts or constant talking, with quick shifts between topics that may or may not be related (flight of ideas).  · Decreased ability to focus or concentrate.  · Increased purposeful activity, such as work, studies, or social activity.  · Increased nonproductive activity. This could be pacing, squirming and fidgeting, or finger and toe tapping.  · Impulsive behavior and poor judgment. This may result in high-risk activities, such as having unprotected sex or spending a lot of money.  Symptoms of depression include:  · Feeling sad, hopeless, or helpless.  · Frequent or uncontrollable crying.  · Lack of feeling or caring about anything.  · Sleeping too much.  · Moving more slowly than  usual.  · Not being able to enjoy things you used to enjoy.  · Wanting to be alone all the time.  · Feeling guilty or worthless.  · Lack of energy or motivation.  · Trouble concentrating or remembering.  · Trouble making decisions.  · Increased appetite.  · Thoughts of death, or the desire to harm yourself.  Sometimes, you may have a mixed mood. This means having symptoms of depression and glenys. Stress can make symptoms worse.  How is this diagnosed?  To diagnose bipolar disorder, your health care provider may ask about your:  · Emotional episodes.  · Medical history.  · Alcohol and drug use. This includes prescription medicines. Certain medical conditions and substances can cause symptoms that seem like bipolar disorder (secondary bipolar disorder).  How is this treated?  Bipolar disorder is a long-term (chronic) illness. It is best controlled with ongoing (continuous) treatment rather than treatment only when symptoms occur. Treatment may include:  · Medicine. Medicine can be prescribed by a provider who specializes in treating mental disorders (psychiatrist).  ? Medicines called mood stabilizers are usually prescribed.  ? If symptoms occur even while taking a mood stabilizer, other medicines may be added.  · Psychotherapy. Some forms of talk therapy, such as cognitive-behavioral therapy (CBT), can provide support, education, and guidance.  · Coping methods, such as journaling or relaxation exercises. These may include:  ? Yoga.  ? Meditation.  ? Deep breathing.  · Lifestyle changes, such as:  ? Limiting alcohol and drug use.  ? Exercising regularly.  ? Getting plenty of sleep.  ? Making healthy eating choices.    A combination of medicine, talk therapy, and coping methods is best. A procedure in which electricity is applied to the brain through the scalp (electroconvulsive therapy) may be used in cases of severe glenys when medicine and psychotherapy work too slowly or do not work.  Follow these instructions at  home:  Activity    · Return to your normal activities as told by your health care provider.  · Find activities that you enjoy, and make time to do them.  · Exercise regularly as told by your health care provider.  Lifestyle  · Limit alcohol intake to no more than 1 drink a day for nonpregnant women and 2 drinks a day for men. One drink equals 12 oz of beer, 5 oz of wine, or 1½ oz of hard liquor.  · Follow a set schedule for eating and sleeping.  · Eat a balanced diet that includes fresh fruits and vegetables, whole grains, low-fat dairy, and lean meat.  · Get 7-8 hours of sleep each night.  General instructions  · Take over-the-counter and prescription medicines only as told by your health care provider.  · Think about joining a support group. Your health care provider may be able to recommend a support group.  · Talk with your family and loved ones about your treatment goals and how they can help.  · Keep all follow-up visits as told by your health care provider. This is important.  Where to find more information  For more information about bipolar disorder, visit the following websites:  · National [place] on Mental [employer]ness: www.sahil.org  · [place] of [employer] Health: www.nimh.nih.gov  Contact a health care provider if:  · Your symptoms get worse.  · You have side effects from your medicine, and they get worse.  · You have trouble sleeping.  · You have trouble doing daily activities.  · You feel unsafe in your surroundings.  · You are dealing with substance abuse.  Get help right away if:  · You have new symptoms.  · You have thoughts about harming yourself.  · You self-harm.  This information is not intended to replace advice given to you by your health care provider. Make sure you discuss any questions you have with your health care provider.  Document Released: 03/26/2002 Document Revised: 08/13/2017 Document Reviewed: 08/17/2017  Elsevier Interactive Patient Education © 2020 [employer]

## 2020-03-09 NOTE — TELEPHONE ENCOUNTER
Pt called back c/o BG over 200 right now and wanted to give more insulin.  However pt had taken her am insulin about 5:30 am today.  Rec'd pt to not take more insulin due to injecting Humalog Mix 75/25 this am (reviewed action of insulin with pt).  Rec'd pt to move more (increase PA) and drink more water.  Pt states she will call us back with how she is doing later today.

## 2020-03-09 NOTE — TELEPHONE ENCOUNTER
Pt called in recent BG's.  Pt c/o insurance not covering diabetes education and does not want to r/s last appt she no showed for.  Pt c/o a lot of stress with work and family.  Cautioned pt of taking too much insulin with hx of low BG's. Pt to see MD in May.  Reviewed importance of eating a small meal/snack after insulin injections and every 4-6 hours during the day and keeping higher carbs food out of her home.  Pt verbalized an understanding.

## 2020-04-07 NOTE — TELEPHONE ENCOUNTER
BG's scanned into phone note.  Pt states she is only taking about 30 units if her BG's are high but doesn't understand why some of her BG's are elevated.  Explained the importance of taking some base dose if BG's are 150 or greater. Pt admits to still drinking higher carb beverages.

## 2020-04-07 NOTE — TELEPHONE ENCOUNTER
4/7/20-- PT REQUESTED ON HER SEROQUEL 200MG------ THAT IT BE INCREASED TO 2 AT NIGHT-- SHE HAS TRIED IT AND THINKS IT HELPS HER WITH SLEEPING AT NIGHT--- WANTS NEW RX AT [facility] ON [address]-- PLEASE  ESCRIBE NEW RX WITH NEW DIRECTIONS/LKS

## 2020-04-21 ENCOUNTER — LAB REQUISITION (OUTPATIENT)
Dept: LAB | Facility: HOSPITAL | Age: 55
End: 2020-04-21

## 2020-04-21 DIAGNOSIS — Z00.00 ENCOUNTER FOR GENERAL ADULT MEDICAL EXAMINATION WITHOUT ABNORMAL FINDINGS: ICD-10-CM

## 2020-04-21 PROCEDURE — 87635 SARS-COV-2 COVID-19 AMP PRB: CPT | Performed by: EMERGENCY MEDICINE

## 2020-04-21 PROCEDURE — U0003 INFECTIOUS AGENT DETECTION BY NUCLEIC ACID (DNA OR RNA); SEVERE ACUTE RESPIRATORY SYNDROME CORONAVIRUS 2 (SARS-COV-2) (CORONAVIRUS DISEASE [COVID-19]), AMPLIFIED PROBE TECHNIQUE, MAKING USE OF HIGH THROUGHPUT TECHNOLOGIES AS DESCRIBED BY CMS-2020-01-R: HCPCS | Performed by: EMERGENCY MEDICINE

## 2020-04-22 LAB — SARS-COV-2 RNA RESP QL NAA+PROBE: NOT DETECTED

## 2020-05-12 NOTE — PATIENT INSTRUCTIONS
Labs drawn today.  Will call you with the lab results.  See eye doctor.  Increase Steglatro to 15 mg daily before breakfast.  Drink plenty of water.  Take insulin everyday, at least 10 units.  Call if blood sugars are running under 100 or over 200.  F/u in 6 months, with fasting labs prior.

## 2020-05-14 NOTE — PROGRESS NOTES
[facility]        Patient Care Team:  Provider, No Known as PCP - Matt Dalton MD as Consulting Physician (Cardiology)    Chief Complaint:    Chief Complaint   Patient presents with   • Diabetes     Type 2          Subjective   Here for diabetes f/u  Blood sugars: higher in am  Doesn't take insulin if blood sugar is in the low 100's  Exercise program: not much due to leg pain    Interval History:     Patient Complaints: Had PCA in January for unstable angina  Patient Denies:  hypoglycemia  History taken from: patient    Review of Systems:   Review of Systems   Constitutional: Positive for unexpected weight gain.   Eyes: Negative for blurred vision.   Gastrointestinal: Negative for nausea.   Endocrine: Negative for polyuria.   Neurological: Negative for headache.     Gained 21 lb since last visit    Objective     Vital Signs      Vitals:    05/12/20 1252   BP: 105/65   Pulse: (!) 124   SpO2: 98%         Physical Exam:     General Appearance:    Alert, cooperative, in no acute distress. Obese   Head:    Normocephalic, without obvious abnormality, atraumatic   Eyes:            Lids and lashes normal, conjunctivae and sclerae normal, no   icterus, no pallor, corneas clear, PERRLA   Throat:   No oral lesions,  oral mucosa moist   Neck:   No adenopathy, supple,  no thyromegaly, no   carotid bruit   Lungs:     Decreased breath sounds    Heart:    Regular rhythm and normal rate   Chest Wall:    No abnormalities observed   Abdomen:     Normal bowel sounds, soft, obese                 Extremities:   Moves all extremities well, no edema               Pulses:   Pulses palpable and equal bilaterally   Skin:   Dry   Neurologic:  DTR 1+ in ankles, normal vibratory sense, able to feel the 10g monofilament          Results Review:    I have reviewed the patient's new clinical results, labs & imaging.    Medication Review:   Prior to Admission medications    Medication Sig Start Date End  "Date Taking? Authorizing Provider   Accu-Chek FastClix Lancets misc 1 each by Other route 3 (Three) Times a Day. 3/2/20  Yes Nimisha Andrews MD   ACCU-CHEK GUIDE test strip 1 each by Other route 3 (Three) Times a Day. 4/25/20  Yes Nimisha Andrews MD   atorvastatin (LIPITOR) 10 MG tablet Take 10 mg by mouth Every Night.   Yes Nimisha Andrews MD B-D INSULIN SYRINGE 1CC/25GX1\" 25G X 1\" 1 ML misc USE TO INJECT INSULIN TWICE DAILY 2/7/20  Yes Nimisha Andrews MD B-D INSULIN SYRINGE 29G X 1/2\" 1 ML misc USE TO INJECT INSULIN TWICE DAILY 2/29/20  Yes Nimisha Andrews MD   clonazePAM (KlonoPIN) 1 MG tablet TAKE 1 TABLET BY MOUTH 2 (TWO) TIMES A DAY AS NEEDED FOR ANXIETY. 5/3/20  Yes Luly Bernard MD   fluticasone (FLONASE) 50 MCG/ACT nasal spray 2 sprays into the nostril(s) as directed by provider Daily.   Yes Nimisha Andrews MD   gabapentin (NEURONTIN) 300 MG capsule Take 300 mg by mouth 3 (Three) Times a Day.   Yes Nimisha Andrews MD   gabapentin (NEURONTIN) 400 MG capsule TAKE 1 CAPSULE BY MOUTH FOUR TIMES A DAY 5/10/20  Yes Luly Bernard MD   glimepiride (AMARYL) 2 MG tablet TAKE TWO TABLETS DAILY. ONE WITH BREAKFAST AND ONE WITH SUPPER 3/20/20  Yes Aris Cao MD   insulin lispro protamine-insulin lispro (humaLOG 75-25) (75-25) 100 UNIT/ML suspension injection Pt to inject 55 units before breakfast and supper 4/16/20  Yes Aris Cao MD   lisinopril (PRINIVIL,ZESTRIL) 30 MG tablet Take 30 mg by mouth Daily.   Yes Nimisha Andrews MD   methadone 1 mg/ml oral suspension Take 70 mg by mouth Daily. Verified 70mg oral solution Dosage at 0450 12/24/19 with [facility] [phone] 6/5/19  Yes Nimisha Andrews MD   nitroglycerin (NITROSTAT) 0.4 MG SL tablet TAKE 1 TABLET UNDER THER TONGUE EVERY 5 MINUTES AS NEEDED FOR CHEST PAIN FOR UP TO 2 DOSES. TAKE NO MORE THAN 3 DOSES IN 15 MINUTES 2/17/20  Yes Matt Whitley MD  "   omeprazole (priLOSEC) 20 MG capsule Take 20 mg by mouth Daily.   Yes Provider, MD Nimisha   QUEtiapine (SEROquel) 400 MG tablet TAKE 1 TABLET BY MOUTH EVERYDAY AT BEDTIME 5/7/20  Yes Luly Bernard MD   ranolazine (RANEXA) 500 MG 12 hr tablet TAKE 1 TABLET BY MOUTH TWICE A DAY 5/5/20  Yes Matt Whitley MD   docusate sodium (COLACE) 100 MG capsule Take 100 mg by mouth 2 (Two) Times a Day As Needed for Constipation.    Provider, MD Nimisha   Ertugliflozin L-PyroglutamicAc (STEGLATRO) 15 MG tablet Take one tab ac breakfast daily 5/12/20   Aris Cao MD       Lab Results (most recent)     Procedure Component Value Units Date/Time    Hemoglobin A1c [[id number]]  (Abnormal) Collected:  05/12/20 1344    Specimen:  Blood Updated:  05/13/20 1107     Hemoglobin A1C 8.8 %     Narrative:       Hemoglobin A1C Reference Range:    <5.7 %        Normal  5.7-6.4 %     Increased risk for diabetes  > 6.4 %        Diabetes       These guidelines have been recommended by the American Diabetic Association for Hgb A1c.      The following 2010 guidelines have been recommended by the American Diabetes Association for Hemoglobin A1c.    HBA1c 5.7-6.4% Increased risk for future diabetes (pre-diabetes)  HBA1c     >6.4% Diabetes      Vitamin D 25 Hydroxy [[id number]]  (Abnormal) Collected:  05/12/20 1344    Specimen:  Blood Updated:  05/12/20 2012     25 Hydroxy, Vitamin D 22.1 ng/ml     Narrative:       Reference Range for Total Vitamin D 25(OH)     Deficiency <20.0 ng/mL   Insufficiency 21-29 ng/mL   Sufficiency  ng/mL  Toxicity >100 ng/ml    Results may be falsely increased if patient taking Biotin.      Comprehensive Metabolic Panel [[id number]]  (Abnormal) Collected:  05/12/20 1344    Specimen:  Blood Updated:  05/12/20 2003     Glucose 330 mg/dL      BUN 22 mg/dL      Creatinine 1.13 mg/dL      Sodium 136 mmol/L      Potassium 4.1 mmol/L      Chloride 95 mmol/L      CO2 24.3 mmol/L      Calcium 9.1  mg/dL      Total Protein 7.5 g/dL      Albumin 4.10 g/dL      ALT (SGPT) 50 U/L      AST (SGOT) 47 U/L      Alkaline Phosphatase 103 U/L      Total Bilirubin 0.3 mg/dL      eGFR Non African Amer 51 mL/min/1.73      Globulin 3.4 gm/dL      A/G Ratio 1.2 g/dL      BUN/Creatinine Ratio 19.5     Anion Gap 16.7 mmol/L     Narrative:       GFR Normal >60  Chronic Kidney Disease <60  Kidney Failure <15      POC Glucose Fingerstick [[id number]]  (Abnormal) Collected:  05/12/20 1250    Specimen:  Blood Updated:  05/12/20 1251     Glucose 326 mg/dL             Lab Results   Component Value Date    HGBA1C 8.8 (H) 05/12/2020    HGBA1C 8.5 (H) 12/23/2019    HGBA1C 9.2 (H) 10/29/2019      Lab Results   Component Value Date    GLUCOSE 330 (H) 05/12/2020    BUN 22 (H) 05/12/2020    CREATININE 1.13 (H) 05/12/2020    EGFRIFNONA 51 (L) 05/12/2020    BCR 19.5 05/12/2020    K 4.1 05/12/2020    CO2 24.3 05/12/2020    CALCIUM 9.1 05/12/2020    ALBUMIN 4.10 05/12/2020    AST 47 (H) 05/12/2020    ALT 50 (H) 05/12/2020    CHOL 170 12/26/2019    LDL 97 12/26/2019    HDL 34 (L) 12/26/2019    TRIG 196 (H) 12/26/2019     Lab Results   Component Value Date    TSH 2.180 10/29/2019    XTSV79UV 22.1 (L) 05/12/2020       Assessment/Plan     Sapphire was seen today for diabetes.    Diagnoses and all orders for this visit:    Type 2 diabetes mellitus with hyperglycemia, without long-term current use of insulin (CMS/[facility])  -     POC Glucose Fingerstick  -     Ertugliflozin L-PyroglutamicAc (STEGLATRO) 15 MG tablet; Take one tab ac breakfast daily  -     Hemoglobin A1c    Vitamin D deficiency  -     Vitamin D 25 Hydroxy    Hyperlipidemia, mixed  -     Comprehensive Metabolic Panel      Take vit D 50,000 units weekly.  Increase Steglatro to 15 mg daily before breakfast.  Drink plenty of water.  Take insulin everyday, at least 10 units.  Call if blood sugars are running under 100 or over 200.        Aris Cao MD  05/14/20  11:31

## 2020-07-01 NOTE — TELEPHONE ENCOUNTER
Patient came into the office to drop off food logs and was upset that we had not reached out to her yet regarding our bariatric program.  We apologized and explained that Covid had us behind and she was in the mix to be qualified through her insurance.  Patient was given an appt with Mirna and program explained again.    Patient then called at 2pm and was cussing stating she wasn't mad at me but she was immediately receiving advertisements on her VM.  She was extremely upset that we had given out her medical information.  I explained that we had given no one her medical information as not only was it unethical but also against the law.  She then ranted about us harassing her to spend money to buy foods for the program on her VM.  Patient would not provided name or number of message.  Patient was given the [facility] number and directed to contact administration but to be prepared to share phone number.

## 2020-07-02 NOTE — ED NOTES
paged @ 1230, spoke to Mandi at the office. She paged Julio who will be calling us back.      Su Wise  07/02/20 1664

## 2020-07-02 NOTE — CODE DOCUMENTATION
EMS called at 0913. Upon arrival, patient in full arrest. CPR initiated. IO placed Left Humeral Head by Loy Rodriguez. 2mg Narcan and 3mg of Epi in route with no response. Patient placed on monitor upon ED arrival and remains in Asystole. Time of Death called by Dr Bhakta at 0921

## 2020-07-02 NOTE — ED NOTES
Julio with the 's office called us back and spoke with the nurse @ 1001     Su Wise  07/02/20 1002

## 2020-07-02 NOTE — ED PROVIDER NOTES
Subjective   History of Present Illness  54-year-old female presents after being found in full arrest this morning.  She had last been seen about 30 minutes prior to arrival.  No CPR was started before fire department arrived.  EMS reported patient was in asystole on arrival and has remained cell throughout resuscitation.  Review of Systems    Past Medical History:   Diagnosis Date   • Anxiety    • Bipolar disorder (CMS/[facility])    • Depression    • Head injury    • Hyperlipidemia 2014   • Hypertension 2009   • Obstructive sleep apnea treated with BiPAP 01/2019   • Psychiatric illness    • Seizure (CMS/[facility])    • Substance abuse (CMS/[facility])    • Type 2 diabetes mellitus (CMS/[facility])    • Withdrawal symptoms, drug or narcotic (CMS/[facility])        No Known Allergies    Past Surgical History:   Procedure Laterality Date   • CARDIAC CATHETERIZATION N/A 12/24/2019    Procedure: Left Heart Cath, possible pci;  Surgeon: Matt Whitley MD;  Location: [facility] CATH INVASIVE LOCATION;  Service: Cardiovascular   • CARDIAC CATHETERIZATION N/A 12/30/2019    Procedure: Percutaneous coronary intervention to LAD;  Surgeon: Matt Whitley MD;  Location: [facility] CATH INVASIVE LOCATION;  Service: Cardiovascular   • CARDIAC CATHETERIZATION N/A 12/30/2019    Procedure: Laser Coronary Atherectomy;  Surgeon: Matt Whitley MD;  Location: [facility] CATH INVASIVE LOCATION;  Service: Cardiovascular   • CARDIAC CATHETERIZATION N/A 12/30/2019    Procedure: Stent DEACON coronary;  Surgeon: Matt Whitley MD;  Location: [facility] CATH INVASIVE LOCATION;  Service: Cardiovascular   • CARDIAC CATHETERIZATION N/A 1/15/2020    Procedure: Left Heart Cath;  Surgeon: Matt Whitley MD;  Location: [facility] CATH INVASIVE LOCATION;  Service: Cardiovascular   • KNEE SURGERY     • TONSILLECTOMY     • TUBAL ABDOMINAL LIGATION         Family History   Adopted: Yes   Family history unknown: Yes       Social History     Socioeconomic  "History   • Marital status: Legally      Spouse name: Not on file   • Number of children: Not on file   • Years of education: Not on file   • Highest education level: Not on file   Tobacco Use   • Smoking status: Current Every Day Smoker     Types: Cigarettes     Last attempt to quit: 2020     Years since quittin.5   • Smokeless tobacco: Never Used   Substance and Sexual Activity   • Alcohol use: No     Frequency: Never   • Drug use: Not Currently     Types: \"Crack\" cocaine, Heroin     Comment: last use    • Sexual activity: Defer           Objective   Physical Exam  54-year-old female CPR in progress.  She is obese.  Patient is noted to be in asystole despite CPR and multiple rounds of epinephrine.  At this point further resuscitative efforts are felt to be futile and code was discontinued.  Procedures           ED Course                                           MDM    Final diagnoses:   Cardiac arrest (CMS/[facility])            Rajinder Bhakta MD  20 1029    "

## (undated) DEVICE — GC 7F 078 XB 3.5: Brand: VISTA BRITE TIP

## (undated) DEVICE — PK TRY HEART CATH 50

## (undated) DEVICE — Device

## (undated) DEVICE — DEV INFL COMPAK W/ACCESSPLUS IN4530

## (undated) DEVICE — PINNACLE INTRODUCER SHEATH: Brand: PINNACLE

## (undated) DEVICE — HI-TORQUE WHISPER MS GUIDE WIRE .014 STRAIGHT TIP 3.0 CM X 190 CM: Brand: HI-TORQUE WHISPER

## (undated) DEVICE — WR CLIP ROTOBLATOR

## (undated) DEVICE — MYNXGRIP 6F/7F: Brand: MYNXGRIP

## (undated) DEVICE — CATH DIAG IMPULSE PIG .056 6F 110CM

## (undated) DEVICE — CATH DIAG IMPULSE FL4 6F 100CM

## (undated) DEVICE — CATH DIAG IMPULSE FR4 6F 100CM

## (undated) DEVICE — GW DIAG EMERALD HEPCOAT MOVE JTIP STD .035 3MM 150CM

## (undated) DEVICE — ELCA™ CORONARY LASER ATHERECTOMY CATHETER: Brand: ELCA™

## (undated) DEVICE — KT DYEVERT PLS EZ W/SMART SYR FOR HI VISC CONTRST DISP